# Patient Record
Sex: FEMALE | Race: WHITE | Employment: FULL TIME | URBAN - NONMETROPOLITAN AREA
[De-identification: names, ages, dates, MRNs, and addresses within clinical notes are randomized per-mention and may not be internally consistent; named-entity substitution may affect disease eponyms.]

---

## 2017-03-20 RX ORDER — ALPRAZOLAM 0.5 MG/1
0.5 TABLET ORAL 2 TIMES DAILY PRN
Qty: 20 TABLET | Refills: 0 | OUTPATIENT
Start: 2017-03-20 | End: 2017-04-19

## 2017-06-07 ENCOUNTER — TELEPHONE (OUTPATIENT)
Dept: OBGYN | Age: 47
End: 2017-06-07

## 2017-06-08 ENCOUNTER — HOSPITAL ENCOUNTER (OUTPATIENT)
Dept: CT IMAGING | Age: 47
Discharge: HOME | End: 2017-06-08
Payer: OTHER GOVERNMENT

## 2017-06-08 ENCOUNTER — HOSPITAL ENCOUNTER (OUTPATIENT)
Dept: VASCULAR LAB | Age: 47
Discharge: HOME | End: 2017-06-08
Payer: OTHER GOVERNMENT

## 2017-06-08 DIAGNOSIS — G44.001 INTRACTABLE CLUSTER HEADACHE SYNDROME, UNSPECIFIED CHRONICITY PATTERN: ICD-10-CM

## 2017-06-08 DIAGNOSIS — R20.2 PARESTHESIA: ICD-10-CM

## 2017-06-08 DIAGNOSIS — R20.2 PARESTHESIA OF SKIN: Primary | ICD-10-CM

## 2017-06-08 PROCEDURE — 93880 EXTRACRANIAL BILAT STUDY: CPT

## 2017-06-08 PROCEDURE — 70450 CT HEAD/BRAIN W/O DYE: CPT

## 2018-05-02 ENCOUNTER — HOSPITAL ENCOUNTER (OUTPATIENT)
Dept: NEUROLOGY | Age: 48
Discharge: HOME | End: 2018-05-02
Payer: OTHER GOVERNMENT

## 2018-05-02 PROCEDURE — 95886 MUSC TEST DONE W/N TEST COMP: CPT

## 2018-05-02 PROCEDURE — 95911 NRV CNDJ TEST 9-10 STUDIES: CPT

## 2018-05-02 PROCEDURE — 95886 MUSC TEST DONE W/N TEST COMP: CPT | Performed by: PSYCHIATRY & NEUROLOGY

## 2018-05-02 PROCEDURE — 95911 NRV CNDJ TEST 9-10 STUDIES: CPT | Performed by: PSYCHIATRY & NEUROLOGY

## 2019-05-30 ENCOUNTER — EMPLOYEE WELLNESS (OUTPATIENT)
Dept: OTHER | Age: 49
End: 2019-05-30

## 2019-05-30 LAB
CHOLESTEROL, TOTAL: 186 MG/DL (ref 160–199)
GLUCOSE BLD-MCNC: 70 MG/DL (ref 74–109)
HDLC SERPL-MCNC: 50 MG/DL (ref 65–121)
LDL CHOLESTEROL CALCULATED: 122 MG/DL
TRIGL SERPL-MCNC: 70 MG/DL (ref 0–149)

## 2019-06-03 VITALS — BODY MASS INDEX: 21.61 KG/M2 | WEIGHT: 122 LBS

## 2019-11-07 ENCOUNTER — HOSPITAL ENCOUNTER (OUTPATIENT)
Dept: WOMENS IMAGING | Age: 49
Discharge: HOME | End: 2019-11-07
Payer: OTHER GOVERNMENT

## 2019-11-07 DIAGNOSIS — Z12.39 ENCOUNTER FOR SCREENING FOR MALIGNANT NEOPLASM OF BREAST: ICD-10-CM

## 2019-11-07 PROCEDURE — 77063 BREAST TOMOSYNTHESIS BI: CPT

## 2019-12-13 LAB
CHOLESTEROL, TOTAL: 200 MG/DL (ref 160–199)
HDLC SERPL-MCNC: 65 MG/DL (ref 65–121)
LDL CHOLESTEROL CALCULATED: 123 MG/DL
TRIGL SERPL-MCNC: 58 MG/DL (ref 0–149)
VITAMIN D 25-HYDROXY: 29.8 NG/ML

## 2019-12-18 LAB
C-REACTIVE PROTEIN: 0.41 MG/DL (ref 0–0.5)
RHEUMATOID FACTOR: <10 IU/ML
SEDIMENTATION RATE, ERYTHROCYTE: 7 MM/HR (ref 0–25)

## 2019-12-19 LAB — ANA IGG, ELISA: DETECTED

## 2020-08-31 ENCOUNTER — OFFICE VISIT (OUTPATIENT)
Age: 50
End: 2020-08-31

## 2020-08-31 VITALS — OXYGEN SATURATION: 98 % | HEART RATE: 69 BPM | TEMPERATURE: 97.7 F

## 2020-09-03 LAB — SARS-COV-2, NAA: NOT DETECTED

## 2020-09-04 ENCOUNTER — ANESTHESIA (OUTPATIENT)
Dept: OPERATING ROOM | Age: 50
End: 2020-09-04

## 2020-09-04 ENCOUNTER — HOSPITAL ENCOUNTER (OUTPATIENT)
Age: 50
Setting detail: OUTPATIENT SURGERY
Discharge: HOME | End: 2020-09-04
Attending: INTERNAL MEDICINE | Admitting: INTERNAL MEDICINE
Payer: OTHER GOVERNMENT

## 2020-09-04 ENCOUNTER — APPOINTMENT (OUTPATIENT)
Dept: OPERATING ROOM | Age: 50
End: 2020-09-04

## 2020-09-04 ENCOUNTER — HOSPITAL ENCOUNTER (OUTPATIENT)
Age: 50
Setting detail: SPECIMEN
Discharge: HOME | End: 2020-09-04
Payer: OTHER GOVERNMENT

## 2020-09-04 ENCOUNTER — ANESTHESIA EVENT (OUTPATIENT)
Dept: OPERATING ROOM | Age: 50
End: 2020-09-04

## 2020-09-04 VITALS
BODY MASS INDEX: 24.27 KG/M2 | WEIGHT: 137 LBS | OXYGEN SATURATION: 96 % | RESPIRATION RATE: 16 BRPM | SYSTOLIC BLOOD PRESSURE: 98 MMHG | HEIGHT: 63 IN | DIASTOLIC BLOOD PRESSURE: 46 MMHG | HEART RATE: 74 BPM

## 2020-09-04 VITALS — SYSTOLIC BLOOD PRESSURE: 101 MMHG | DIASTOLIC BLOOD PRESSURE: 55 MMHG | OXYGEN SATURATION: 95 %

## 2020-09-04 PROCEDURE — 43248 EGD GUIDE WIRE INSERTION: CPT

## 2020-09-04 PROCEDURE — 43239 EGD BIOPSY SINGLE/MULTIPLE: CPT | Performed by: INTERNAL MEDICINE

## 2020-09-04 PROCEDURE — 43248 EGD GUIDE WIRE INSERTION: CPT | Performed by: INTERNAL MEDICINE

## 2020-09-04 PROCEDURE — 43239 EGD BIOPSY SINGLE/MULTIPLE: CPT

## 2020-09-04 PROCEDURE — 88305 TISSUE EXAM BY PATHOLOGIST: CPT

## 2020-09-04 PROCEDURE — 88342 IMHCHEM/IMCYTCHM 1ST ANTB: CPT

## 2020-09-04 RX ORDER — LIDOCAINE HYDROCHLORIDE 10 MG/ML
INJECTION, SOLUTION EPIDURAL; INFILTRATION; INTRACAUDAL; PERINEURAL PRN
Status: DISCONTINUED | OUTPATIENT
Start: 2020-09-04 | End: 2020-09-04 | Stop reason: SDUPTHER

## 2020-09-04 RX ORDER — LEFLUNOMIDE 20 MG/1
20 TABLET ORAL DAILY
COMMUNITY
End: 2021-06-29 | Stop reason: ALTCHOICE

## 2020-09-04 RX ORDER — PROPOFOL 10 MG/ML
INJECTION, EMULSION INTRAVENOUS PRN
Status: DISCONTINUED | OUTPATIENT
Start: 2020-09-04 | End: 2020-09-04 | Stop reason: SDUPTHER

## 2020-09-04 RX ORDER — HYDROXYCHLOROQUINE SULFATE 200 MG/1
200 TABLET, FILM COATED ORAL DAILY
COMMUNITY
End: 2021-06-29 | Stop reason: ALTCHOICE

## 2020-09-04 RX ORDER — PANTOPRAZOLE SODIUM 40 MG/1
40 TABLET, DELAYED RELEASE ORAL DAILY
Qty: 30 TABLET | Refills: 11 | Status: SHIPPED | OUTPATIENT
Start: 2020-09-04 | End: 2021-03-19

## 2020-09-04 RX ORDER — SODIUM CHLORIDE 9 MG/ML
INJECTION, SOLUTION INTRAVENOUS CONTINUOUS
Status: DISCONTINUED | OUTPATIENT
Start: 2020-09-04 | End: 2020-09-04 | Stop reason: HOSPADM

## 2020-09-04 RX ORDER — EPHEDRINE SULFATE 50 MG/ML
INJECTION, SOLUTION INTRAVENOUS PRN
Status: DISCONTINUED | OUTPATIENT
Start: 2020-09-04 | End: 2020-09-04 | Stop reason: SDUPTHER

## 2020-09-04 RX ORDER — MIDAZOLAM HYDROCHLORIDE 1 MG/ML
2 INJECTION INTRAMUSCULAR; INTRAVENOUS ONCE
Status: COMPLETED | OUTPATIENT
Start: 2020-09-04 | End: 2020-09-04

## 2020-09-04 RX ADMIN — EPHEDRINE SULFATE 10 MG: 50 INJECTION, SOLUTION INTRAVENOUS at 09:12

## 2020-09-04 RX ADMIN — PROPOFOL 310 MG: 10 INJECTION, EMULSION INTRAVENOUS at 09:03

## 2020-09-04 RX ADMIN — LIDOCAINE HYDROCHLORIDE 5 ML: 10 INJECTION, SOLUTION EPIDURAL; INFILTRATION; INTRACAUDAL; PERINEURAL at 09:03

## 2020-09-04 RX ADMIN — SODIUM CHLORIDE: 9 INJECTION, SOLUTION INTRAVENOUS at 08:14

## 2020-09-04 RX ADMIN — MIDAZOLAM HYDROCHLORIDE 2 MG: 1 INJECTION INTRAMUSCULAR; INTRAVENOUS at 08:14

## 2020-09-04 ASSESSMENT — LIFESTYLE VARIABLES: SMOKING_STATUS: 0

## 2020-09-04 NOTE — H&P
Patient Name: Vera Thomas  : 1970  MRN: [id number]  DATE: 20    Allergies: Allergies   Allergen Reactions    Codeine Itching and Rash    Sulfa Antibiotics         ENDOSCOPY  History and Physical    Procedure:    [] Diagnostic Colonoscopy       [] Screening Colonoscopy  [x] EGD      [] ERCP      [] EUS       [] Other    [x] Previous office notes/History and Physical reviewed from the patients chart. Please see EMR for further details of HPI. I have examined the patient's status immediately prior to the procedure and:      Indications/HPI:    []Abdominal Pain   []Cancer- GI/Lung     []Fhx of colon CA/polyps  []History of Polyps  []Barretts            []Melena  []Abnormal Imaging              [x]Dysphagia              []Persistent Pneumonia   []Anemia                            []Food Impaction        []History of Polyps  [] GI Bleed             []Pulmonary nodule/Mass   []Change in bowel habits []Heartburn/Reflux  []Rectal Bleed (BRBPR)  []Chest Pain - Non Cardiac []Heme (+) Stool []Ulcers  []Constipation  []Hemoptysis  []Varices  []Diarrhea  []Hypoxemia    []Nausea/Vomiting   []Screening   []Crohns/Colitis  []Other:     Anesthesia:   [x] MAC [] Moderate Sedation   [] General   [] None     ROS: 12 pt Review of Symptoms was negative unless mentioned above    Medications:   Prior to Admission medications    Medication Sig Start Date End Date Taking?  Authorizing Provider   leflunomide (ARAVA) 20 MG tablet Take 20 mg by mouth daily   Yes Historical Provider, MD   hydroxychloroquine (PLAQUENIL) 200 MG tablet Take 200 mg by mouth daily   Yes Historical Provider, MD   prednisoLONE 5 MG TABS Take by mouth   Yes Historical Provider, MD   naproxen (NAPROSYN) 500 MG tablet  6/23/15  Yes Historical Provider, MD   estradiol (ESTRACE) 2 MG tablet TAKE 1 TABLET BY MOUTH DAILY 16   Mark Reyes MD   fluticasone [facility]) 50 MCG/ACT nasal spray 2 sprays by Nasal route daily 16   Mark Reyes MD azelastine (ASTELIN) 0.1 % nasal spray 2 sprays by Nasal route 2 times daily 8/29/16   Mat Hannah MD   [facility]. 2.5 % rectal cream Apply BID as needed for hemorrhoids 8/29/16   Mat Hannah MD   omeprazole (PRILOSEC) 40 MG capsule  3/30/15   Historical Provider, MD   Vitamin D (CHOLECALCIFEROL) 1000 UNITS CAPS capsule Take 1,000 Units by mouth daily    Historical Provider, MD       Past Medical History:  Past Medical History:   Diagnosis Date    Asthma     Endometriosis     Osteopenia 2013       Past Surgical History:  Past Surgical History:   Procedure Laterality Date    COLONOSCOPY  10/17/2016    Dr Sobeida Roche, 5 yr recall    HYSTERECTOMY, VAGINAL      Total    LAPAROSCOPY         Social History:  Social History     Tobacco Use    Smoking status: Former Smoker    Smokeless tobacco: Never Used   Substance Use Topics    Alcohol use: No     Alcohol/week: 0.0 standard drinks    Drug use: No       Vital Signs:   Vitals:    09/04/20 0744   BP: 117/76   Pulse: 64   Resp: 16   SpO2: 99%        Physical Exam:  Cardiac:  [x]WNL  []Comments:  Pulmonary:  [x]WNL   []Comments:  Neuro/Mental Status:  [x]WNL  []Comments:  Abdominal:  [x]WNL    []Comments:  Other:   []WNL  []Comments:    Informed Consent:  The risks and benefits of the procedure have been discussed with either the patient or if they cannot consent, their representative. Assessment:  Patient examined and appropriate for planned sedation and procedure. Plan:  Proceed with planned sedation and procedure as above.          Oscar Cline MD

## 2020-09-04 NOTE — OP NOTE
Esophagitis vs Silverman's - biopsied      RECOMMENDATIONS:    1. Await path results, the patient will be contacted in 7-10 days with biopsy results. 2.  PPI daily  3. NSAID avoidance  4. Repeat Dilation as needed. The results were discussed with the patient and family. A copy of the images obtained were given to the patient.      Oscar Cline MD  9/4/2020  9:31 AM

## 2020-09-15 ENCOUNTER — TELEPHONE (OUTPATIENT)
Dept: URGENT CARE | Facility: CLINIC | Age: 50
End: 2020-09-15

## 2020-09-15 PROCEDURE — 87635 SARS-COV-2 COVID-19 AMP PRB: CPT | Performed by: NURSE PRACTITIONER

## 2021-01-27 ENCOUNTER — HOSPITAL ENCOUNTER (OUTPATIENT)
Dept: CT IMAGING | Age: 51
Discharge: HOME | End: 2021-01-27
Payer: OTHER GOVERNMENT

## 2021-01-27 ENCOUNTER — OFFICE VISIT (OUTPATIENT)
Dept: SURGERY | Age: 51
End: 2021-01-27
Payer: OTHER GOVERNMENT

## 2021-01-27 VITALS
WEIGHT: 135 LBS | BODY MASS INDEX: 23.92 KG/M2 | DIASTOLIC BLOOD PRESSURE: 74 MMHG | SYSTOLIC BLOOD PRESSURE: 118 MMHG | HEIGHT: 63 IN | TEMPERATURE: 97.2 F

## 2021-01-27 DIAGNOSIS — R10.84 GENERALIZED ABDOMINAL PAIN: ICD-10-CM

## 2021-01-27 DIAGNOSIS — R10.13 EPIGASTRIC PAIN: ICD-10-CM

## 2021-01-27 DIAGNOSIS — R10.11 RIGHT UPPER QUADRANT ABDOMINAL PAIN: Primary | ICD-10-CM

## 2021-01-27 DIAGNOSIS — R10.13 EPIGASTRIC PAIN: Primary | ICD-10-CM

## 2021-01-27 DIAGNOSIS — Z76.89 ENCOUNTER TO ESTABLISH CARE: ICD-10-CM

## 2021-01-27 LAB
ALBUMIN SERPL-MCNC: 4.6 G/DL (ref 3.5–5.2)
ALP BLD-CCNC: 81 U/L (ref 35–104)
ALT SERPL-CCNC: 40 U/L (ref 5–33)
ANION GAP SERPL CALCULATED.3IONS-SCNC: 10 MMOL/L (ref 7–19)
AST SERPL-CCNC: 29 U/L (ref 5–32)
BASOPHILS ABSOLUTE: 0 K/UL (ref 0–0.2)
BASOPHILS RELATIVE PERCENT: 0.7 % (ref 0–1)
BILIRUB SERPL-MCNC: <0.2 MG/DL (ref 0.2–1.2)
BUN BLDV-MCNC: 15 MG/DL (ref 6–20)
CALCIUM SERPL-MCNC: 9.1 MG/DL (ref 8.6–10)
CHLORIDE BLD-SCNC: 103 MMOL/L (ref 98–111)
CO2: 29 MMOL/L (ref 22–29)
CREAT SERPL-MCNC: 0.7 MG/DL (ref 0.5–0.9)
EOSINOPHILS ABSOLUTE: 0.1 K/UL (ref 0–0.6)
EOSINOPHILS RELATIVE PERCENT: 1.8 % (ref 0–5)
GFR AFRICAN AMERICAN: >59
GFR NON-AFRICAN AMERICAN: >60
GLUCOSE BLD-MCNC: 78 MG/DL (ref 74–109)
HCT VFR BLD CALC: 40.8 % (ref 37–47)
HEMOGLOBIN: 13.5 G/DL (ref 12–16)
IMMATURE GRANULOCYTES #: 0 K/UL
LYMPHOCYTES ABSOLUTE: 1.8 K/UL (ref 1.1–4.5)
LYMPHOCYTES RELATIVE PERCENT: 29.5 % (ref 20–40)
MCH RBC QN AUTO: 31.1 PG (ref 27–31)
MCHC RBC AUTO-ENTMCNC: 33.1 G/DL (ref 33–37)
MCV RBC AUTO: 94 FL (ref 81–99)
MONOCYTES ABSOLUTE: 0.6 K/UL (ref 0–0.9)
MONOCYTES RELATIVE PERCENT: 9.8 % (ref 0–10)
NEUTROPHILS ABSOLUTE: 3.6 K/UL (ref 1.5–7.5)
NEUTROPHILS RELATIVE PERCENT: 58 % (ref 50–65)
PDW BLD-RTO: 12.7 % (ref 11.5–14.5)
PLATELET # BLD: 206 K/UL (ref 130–400)
PMV BLD AUTO: 12.4 FL (ref 9.4–12.3)
POTASSIUM SERPL-SCNC: 3.8 MMOL/L (ref 3.5–5)
RBC # BLD: 4.34 M/UL (ref 4.2–5.4)
SODIUM BLD-SCNC: 142 MMOL/L (ref 136–145)
TOTAL PROTEIN: 7.3 G/DL (ref 6.6–8.7)
WBC # BLD: 6.1 K/UL (ref 4.8–10.8)

## 2021-01-27 PROCEDURE — 99204 OFFICE O/P NEW MOD 45 MIN: CPT | Performed by: SURGERY

## 2021-01-27 PROCEDURE — 74177 CT ABD & PELVIS W/CONTRAST: CPT

## 2021-01-27 PROCEDURE — 6360000004 HC RX CONTRAST MEDICATION: Performed by: SURGERY

## 2021-01-27 RX ADMIN — IOPAMIDOL 90 ML: 755 INJECTION, SOLUTION INTRAVENOUS at 13:49

## 2021-01-27 ASSESSMENT — ENCOUNTER SYMPTOMS
SORE THROAT: 0
COUGH: 0
EYE REDNESS: 0
ABDOMINAL DISTENTION: 0
EYE PAIN: 0
NAUSEA: 0
SHORTNESS OF BREATH: 0
DIARRHEA: 0
COLOR CHANGE: 0
CHEST TIGHTNESS: 0
VOMITING: 0
BACK PAIN: 0
CONSTIPATION: 0
ABDOMINAL PAIN: 0

## 2021-01-27 NOTE — PROGRESS NOTES
SUBJECTIVE:  Ms. Melia Red is a 45 y.o. female who presents today with abdominal pain. She sates it started in early December. It has not improved since that time. The pain is above the belly-button and in the epigastric region without radiation. Sometimes if she can get in a comfortable position it will improve. She had an EGD in September that showed gastritis and esophagitis and this feels different from that. She tried a colon cleanse and has not seen improvement. Her bowel movements fluctuate between loose and constipation due to her sjogren's syndrome. She denies nausea and vomiting. At times she takes aleve as needed but hasn't taken it recently, only when her RA really acts up. Past Medical History:   Diagnosis Date    Allergic rhinitis     Asthma     Endometriosis     GERD (gastroesophageal reflux disease)     Osteopenia 2013    RA (rheumatoid arthritis) ([address]     Sjogren's disease [address] [address]      Past Surgical History:   Procedure Laterality Date    COLONOSCOPY  10/17/2016    Dr Werner Al, 5 yr recall    HYSTERECTOMY, VAGINAL      total-lap ass    TUBAL LIGATION      UPPER GASTROINTESTINAL ENDOSCOPY N/A 9/4/2020    Dr Queenie Cornelius-[id number], gastritis     Current Outpatient Medications   Medication Sig Dispense Refill    leflunomide (ARAVA) 20 MG tablet Take 20 mg by mouth daily      hydroxychloroquine (PLAQUENIL) 200 MG tablet Take 200 mg by mouth daily      prednisoLONE 5 MG TABS Take by mouth      pantoprazole (PROTONIX) 40 MG tablet Take 1 tablet by mouth daily Take daily first thing in the morning on an empty stomach.  30 tablet 11    estradiol (ESTRACE) 2 MG tablet TAKE 1 TABLET BY MOUTH DAILY 30 tablet 11    fluticasone (FLONASE) 50 MCG/ACT nasal spray 2 sprays by Nasal route daily (Patient not taking: Reported on 1/27/2021) 1 Bottle 11    azelastine (ASTELIN) 0.1 % nasal spray 2 sprays by Nasal route 2 times daily (Patient not taking: Reported on 1/27/2021) 1 Bottle 11    PROCTOSOL HC 2.5 % rectal cream Apply BID as needed for hemorrhoids (Patient not taking: Reported on 1/27/2021) 1 Tube 6    naproxen (NAPROSYN) 500 MG tablet       Vitamin D (CHOLECALCIFEROL) 1000 UNITS CAPS capsule Take 1,000 Units by mouth daily       No current facility-administered medications for this visit. Allergies: Codeine, Morphine, and Sulfa antibiotics    Family History   Problem Relation Age of Onset    Uterine Cancer Mother 22    Stomach Cancer Mother     Breast Cancer Maternal Grandmother 28    Ovarian Cancer Maternal Aunt 28    Cancer Maternal Uncle 27        melanoma    Cancer Maternal Uncle         liver       Social History     Tobacco Use    Smoking status: Never Smoker    Smokeless tobacco: Never Used   Substance Use Topics    Alcohol use: No     Alcohol/week: 0.0 standard drinks       Review of Systems   Constitutional: Positive for fatigue. Negative for fever and unexpected weight change. HENT: Positive for tinnitus. Negative for hearing loss, nosebleeds and sore throat. Eyes: Negative for pain, redness and visual disturbance. Respiratory: Negative for cough, chest tightness and shortness of breath. Cardiovascular: Negative for chest pain, palpitations and leg swelling. Gastrointestinal: Negative for abdominal distention, abdominal pain, constipation, diarrhea, nausea and vomiting. Endocrine: Positive for cold intolerance and polydipsia. Negative for heat intolerance. Genitourinary: Negative for difficulty urinating, frequency and urgency. Musculoskeletal: Positive for arthralgias. Negative for back pain, joint swelling and neck pain. Skin: Negative for color change, rash and wound. Allergic/Immunologic: Positive for immunocompromised state. Negative for environmental allergies and food allergies. Neurological: Negative for seizures, light-headedness and headaches. Hematological: Negative for adenopathy. Does not bruise/bleed easily. Psychiatric/Behavioral: Negative for confusion, sleep disturbance and suicidal ideas. OBJECTIVE:  /74 (Site: Right Upper Arm, Position: Sitting, Cuff Size: Medium Adult)   Temp 97.2 °F (36.2 °C) (Temporal)   Ht 5' 3\" (1.6 m)   Wt 135 lb (61.2 kg)   BMI 23.91 kg/m²   Physical Exam  Vitals signs reviewed. Constitutional:       Appearance: She is well-developed. HENT:      Head: Normocephalic and atraumatic. Eyes:      Pupils: Pupils are equal, round, and reactive to light. Neck:      Musculoskeletal: Normal range of motion. Cardiovascular:      Rate and Rhythm: Normal rate and regular rhythm. Heart sounds: Normal heart sounds. Pulmonary:      Effort: Pulmonary effort is normal.      Breath sounds: Normal breath sounds. No wheezing or rales. Abdominal:      General: Bowel sounds are normal. There is no distension. Palpations: Abdomen is soft. Tenderness: There is abdominal tenderness in the right upper quadrant, right lower quadrant, epigastric area and periumbilical area. There is no guarding or rebound. Negative signs include Stoddard's sign. Hernia: No hernia is present. Comments: Areas of maximum tenderness as marked. Musculoskeletal: Normal range of motion. Lymphadenopathy:      Cervical: No cervical adenopathy. Skin:     General: Skin is warm and dry. Neurological:      Mental Status: She is alert and oriented to person, place, and time. Deep Tendon Reflexes: Reflexes are normal and symmetric. Psychiatric:         Behavior: Behavior normal.         Thought Content: Thought content normal.         Judgment: Judgment normal.         ASSESSMENT:   Diagnosis Orders   1. Epigastric pain  CBC Auto Differential    Comprehensive Metabolic Panel    CT ABDOMEN PELVIS W IV CONTRAST Additional Contrast? Oral   2. Generalized abdominal pain  CT ABDOMEN PELVIS W IV CONTRAST Additional Contrast? Oral   3.  Encounter to establish care  External Referral To Family Practice       PLAN:  Orders Placed This Encounter   Procedures    CT ABDOMEN PELVIS W IV CONTRAST Additional Contrast? Oral     Standing Status:   Future     Standing Expiration Date:   1/27/2022     Order Specific Question:   Additional Contrast?     Answer:   Oral     Order Specific Question:   Reason for exam:     Answer:   epigastric and right sided pain for 1 month    CBC Auto Differential     Standing Status:   Future     Number of Occurrences:   1     Standing Expiration Date:   1/27/2022    Comprehensive Metabolic Panel     Standing Status:   Future     Number of Occurrences:   1     Standing Expiration Date:   1/27/2022    External Referral To Family Practice     Referral Priority:   Routine     Referral Type:   Eval and Treat     Referral Reason:   Specialty Services Required     Requested Specialty:   Family Medicine     Number of Visits Requested:   1     Will follow up on testing and contact patient with results. If CT is unremarkable will proceed with RUQ sonogram/HIDA. Return for Results Discussion.     Rod Arndt DO 1/03/4092 12:08 PM

## 2021-01-27 NOTE — PROGRESS NOTES
Reviewed CT scan and labs with patient. No significant findings. Will order RUQ US and HIDA if needed. Plan to drink miralax to assist in BMs. Patient notes udnerstanding. If pain resolves with miralax will cancel imaging.      Impression   1. No acute inflammatory process seen in the abdomen or pelvis. Gallbladder is contracted and therefore limited in its assessment. No   peripancreatic inflammation. Decompressed stomach. Moderate stool in   the colon. Normal appendix. Prior hysterectomy.    Signed by Dr Lopez Courser on 1/27/2021 2:15 PM         WBC 6.1  4.8 - 10.8 K/uL Final 01/27/2021 12:08 PM [address] [facility]   RBC 4.34  4.20 - 5.40 M/uL Final 01/27/2021 12:08 PM [address] [facility]   Hemoglobin 13.5  12.0 - 16.0 g/dL Final 01/27/2021 12:08 PM [address] [facility]   Hematocrit 40.8  37.0 - 47.0 % Final 01/27/2021 12:08 PM [facility]   MCV 94.0  81.0 - 99.0 fL Final 01/27/2021 12:08 PM [facility]   [facility] 31.1High   27.0 - 31.0 pg Final 01/27/2021 12:08 PM [address] [facility]   MCHC 33.1  33.0 - 37.0 g/dL Final 01/27/2021 12:08 PM [address] [facility]   RDW 12.7  11.5 - 14.5 % Final 01/27/2021 12:08 PM [address] [facility]   Platelets 118  043 - 400 K/uL Final 01/27/2021 12:08 PM [facility]   MPV 12.4High   9.4 - 12.3 fL Final 01/27/2021 12:08 PM [facility]   Neutrophils % 58.0  50.0 - 65.0 % Final 01/27/2021 12:08 PM [facility]   Lymphocytes % 29.5  20.0 - 40.0 % Final 01/27/2021 12:08 PM [address] [facility]   Monocytes % 9.8  0.0 - 10.0 % Final 01/27/2021 12:08 PM [address] [facility]   Eosinophils % 1.8  0.0 - 5.0 % Final 01/27/2021 12:08 PM [facility]   Basophils % 0.7  0.0 - 1.0 % Final 01/27/2021 12:08 PM [address] [facility]   Neutrophils Absolute 3.6  1.5 - 7.5 K/uL Final 01/27/2021 12:08 PM [address] [facility]   Immature Granulocytes # 0.0  K/uL Final 01/27/2021 12:08 PM [address] [facility]   Lymphocytes Absolute 1.8  1.1 - 4.5 K/uL Final 01/27/2021 12:08 PM [address] [facility]   Monocytes Absolute 0.60  0.00 - 0.90 K/uL Final 01/27/2021 12:08 PM [address] [facility]   Eosinophils Absolute 0.10  0.00 - 0.60 K/uL Final 01/27/2021 12:08 PM [facility]   Basophils Absolute 0.00  0.00 - 0.20 K/uL Final 01/27/2021 12:08 PM [address] [facility]   Testing Performed By    Luis Sanchez Name Director Address Valid Date Range   [address] - [address] LAB Isidra Asif MD [address] [id number] 08/30/17 0733-Present     Sodium 142  136 - 145 mmol/L Final 01/27/2021 12:08 PM [address] [place] [address] Lab   Potassium 3.8  3.5 - 5.0 mmol/L Final 01/27/2021 12:08 PM [address] [place] [address] Lab   Chloride 103  98 - 111 mmol/L Final 01/27/2021 12:08 PM [facility] Lab   CO2 29  22 - 29 mmol/L Final 01/27/2021 12:08 PM [facility]   Anion Gap 10  7 - 19 mmol/L Final 01/27/2021 12:08 PM [facility] Lab   Glucose 78  74 - 109 mg/dL Final 01/27/2021 12:08 PM [facility]   BUN 15  6 - 20 mg/dL Final 01/27/2021 12:08 PM [facility]   CREATININE 0.7  0.5 - 0.9 mg/dL Final 01/27/2021 12:08 PM [address] [facility] Lab   GFR Non-African American >60  >60 Final 01/27/2021 12:08 PM [address] [facility]   This calculation may be inaccurate for patients under the age of 25 years. For ages 25 and older, a GFR >60 mL/min/1.73m2 (not corrected for weight) is   valid for stable renal function. GFR  >59  >59 Final 01/27/2021 12:08 PM [address] [facility] Lab   Chronic Kidney Disease: less than 60 ml/min/1.73 sq. m.         Kidney Failure: less than 15 ml/min/1.73 sq.m. Results valid for patients 18 years and older.     Calcium 9.1  8.6 - 10.0 mg/dL Final 01/27/2021 12:08 PM [address] [facility] Lab   Total Protein 7.3 6.6 - 8.7 g/dL Final 01/27/2021 12:08  [address] Lab   Albumin 4.6  3.5 - 5.2 g/dL Final 01/27/2021 12:08  [address] Lab   Total Bilirubin <0.2  0.2 - 1.2 mg/dL Final 01/27/2021 12:08  [address] Lab   Alkaline Phosphatase 81  35 - 104 U/L Final 01/27/2021 12:08  [address] Lab   ALT 40High   5 - 33 U/L Final 01/27/2021 12:08  [address] Lab   AST 29  5 - 32 U/L Final 01/27/2021 12:08 PM [facility] Lab   Testing Performed By    Luis Sanchez Name Director Address Valid Date Range   [address] - [address] LAB [place]  [place],[address]   [address] [address] 08/30/17 [address], DO  3/79/42  3:34 PM

## 2021-02-03 ENCOUNTER — OFFICE VISIT (OUTPATIENT)
Age: 51
End: 2021-02-03

## 2021-02-03 VITALS — OXYGEN SATURATION: 95 % | HEART RATE: 70 BPM

## 2021-02-03 DIAGNOSIS — Z11.59 SCREENING FOR VIRAL DISEASE: Primary | ICD-10-CM

## 2021-02-03 LAB — SARS-COV-2, PCR: NOT DETECTED

## 2021-02-03 PROCEDURE — 99999 PR OFFICE/OUTPT VISIT,PROCEDURE ONLY: CPT | Performed by: NURSE PRACTITIONER

## 2021-02-05 ENCOUNTER — APPOINTMENT (OUTPATIENT)
Dept: OPERATING ROOM | Age: 51
End: 2021-02-05

## 2021-02-05 ENCOUNTER — TELEPHONE (OUTPATIENT)
Dept: SURGERY | Age: 51
End: 2021-02-05

## 2021-02-05 ENCOUNTER — ANESTHESIA (OUTPATIENT)
Dept: OPERATING ROOM | Age: 51
End: 2021-02-05

## 2021-02-05 ENCOUNTER — HOSPITAL ENCOUNTER (OUTPATIENT)
Age: 51
Setting detail: SPECIMEN
Discharge: HOME | End: 2021-02-05
Payer: OTHER GOVERNMENT

## 2021-02-05 ENCOUNTER — ANESTHESIA EVENT (OUTPATIENT)
Dept: OPERATING ROOM | Age: 51
End: 2021-02-05

## 2021-02-05 ENCOUNTER — HOSPITAL ENCOUNTER (OUTPATIENT)
Age: 51
Setting detail: OUTPATIENT SURGERY
Discharge: HOME | End: 2021-02-05
Attending: INTERNAL MEDICINE | Admitting: INTERNAL MEDICINE
Payer: OTHER GOVERNMENT

## 2021-02-05 VITALS
HEART RATE: 63 BPM | OXYGEN SATURATION: 97 % | HEIGHT: 63 IN | TEMPERATURE: 97.2 F | BODY MASS INDEX: 23.04 KG/M2 | RESPIRATION RATE: 18 BRPM | SYSTOLIC BLOOD PRESSURE: 108 MMHG | DIASTOLIC BLOOD PRESSURE: 63 MMHG | WEIGHT: 130 LBS

## 2021-02-05 VITALS — DIASTOLIC BLOOD PRESSURE: 28 MMHG | SYSTOLIC BLOOD PRESSURE: 116 MMHG | OXYGEN SATURATION: 98 %

## 2021-02-05 PROCEDURE — 43239 EGD BIOPSY SINGLE/MULTIPLE: CPT

## 2021-02-05 PROCEDURE — 88342 IMHCHEM/IMCYTCHM 1ST ANTB: CPT

## 2021-02-05 PROCEDURE — 43248 EGD GUIDE WIRE INSERTION: CPT

## 2021-02-05 PROCEDURE — 43248 EGD GUIDE WIRE INSERTION: CPT | Performed by: INTERNAL MEDICINE

## 2021-02-05 PROCEDURE — G8907 PT DOC NO EVENTS ON DISCHARG: HCPCS

## 2021-02-05 PROCEDURE — 88305 TISSUE EXAM BY PATHOLOGIST: CPT

## 2021-02-05 PROCEDURE — 43239 EGD BIOPSY SINGLE/MULTIPLE: CPT | Performed by: INTERNAL MEDICINE

## 2021-02-05 PROCEDURE — G8918 PT W/O PREOP ORDER IV AB PRO: HCPCS

## 2021-02-05 PROCEDURE — 45378 DIAGNOSTIC COLONOSCOPY: CPT

## 2021-02-05 PROCEDURE — 45378 DIAGNOSTIC COLONOSCOPY: CPT | Performed by: INTERNAL MEDICINE

## 2021-02-05 RX ORDER — PROPOFOL 10 MG/ML
INJECTION, EMULSION INTRAVENOUS PRN
Status: DISCONTINUED | OUTPATIENT
Start: 2021-02-05 | End: 2021-02-05 | Stop reason: SDUPTHER

## 2021-02-05 RX ORDER — SODIUM CHLORIDE 9 MG/ML
INJECTION, SOLUTION INTRAVENOUS CONTINUOUS
Status: DISCONTINUED | OUTPATIENT
Start: 2021-02-05 | End: 2021-02-05 | Stop reason: HOSPADM

## 2021-02-05 RX ORDER — LIDOCAINE HYDROCHLORIDE 10 MG/ML
INJECTION, SOLUTION EPIDURAL; INFILTRATION; INTRACAUDAL; PERINEURAL PRN
Status: DISCONTINUED | OUTPATIENT
Start: 2021-02-05 | End: 2021-02-05 | Stop reason: SDUPTHER

## 2021-02-05 RX ADMIN — LIDOCAINE HYDROCHLORIDE 50 MG: 10 INJECTION, SOLUTION EPIDURAL; INFILTRATION; INTRACAUDAL; PERINEURAL at 08:15

## 2021-02-05 RX ADMIN — PROPOFOL 350 MG: 10 INJECTION, EMULSION INTRAVENOUS at 08:15

## 2021-02-05 RX ADMIN — SODIUM CHLORIDE: 9 INJECTION, SOLUTION INTRAVENOUS at 08:07

## 2021-02-05 NOTE — TELEPHONE ENCOUNTER
Called and left vm message with patient to see if she had been contacted by Dr. Jose Ocampo office about an appt there.

## 2021-02-05 NOTE — ANESTHESIA PRE PROCEDURE
Department of Anesthesiology  Preprocedure Note       Name:  Valorie Dominguez   Age:  45 y.o.  :  1970                                          MRN:  [id number]         Date:  2021      Surgeon: Darlyn Nava):  Fauzia Baker MD    Procedure: Procedure(s):  EGD ESOPHAGOGASTRODUODENOSCOPY  COLONOSCOPY DIAGNOSTIC    Medications prior to admission:   Prior to Admission medications    Medication Sig Start Date End Date Taking? Authorizing Provider   leflunomide (ARAVA) 20 MG tablet Take 20 mg by mouth daily    Historical Provider, MD   hydroxychloroquine (PLAQUENIL) 200 MG tablet Take 200 mg by mouth daily    Historical Provider, MD   prednisoLONE 5 MG TABS Take by mouth    Historical Provider, MD   pantoprazole (PROTONIX) 40 MG tablet Take 1 tablet by mouth daily Take daily first thing in the morning on an empty stomach. 20   Fauzia Baker MD   estradiol (ESTRACE) 2 MG tablet TAKE 1 TABLET BY MOUTH DAILY 16   Isiah Newsome MD   fluticasone [facility]) 50 MCG/ACT nasal spray 2 sprays by Nasal route daily  Patient not taking: Reported on 2021   Isiah Newsome MD   azelastine (ASTELIN) 0.1 % nasal spray 2 sprays by Nasal route 2 times daily  Patient not taking: Reported on 2021   Isiah Newsome MD   PROCTOSOL HC 2.5 % rectal cream Apply BID as needed for hemorrhoids  Patient not taking: Reported on 2021   Isiah Newsome MD   naproxen (NAPROSYN) 500 MG tablet  6/23/15   Historical Provider, MD   Vitamin D (CHOLECALCIFEROL) 1000 UNITS CAPS capsule Take 1,000 Units by mouth daily    Historical Provider, MD       Current medications:    Current Facility-Administered Medications   Medication Dose Route Frequency Provider Last Rate Last Admin    0.9 % sodium chloride infusion   Intravenous Continuous Fauzia Baker  mL/hr at 21 0807 New Bag at 21 0807       Allergies:     Allergies   Allergen Reactions    Codeine Itching and Rash    Morphine      migraines  Sulfa Antibiotics        Problem List:    Patient Active Problem List   Diagnosis Code    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       Past Medical History:        Diagnosis Date    Allergic rhinitis     Asthma     Endometriosis     GERD (gastroesophageal reflux disease)     Osteopenia 2013    [facility]) [facility] [address]     Sjogren's disease ([facility] [address]        Past Surgical History:        Procedure Laterality Date    COLONOSCOPY  10/17/2016    Dr Alec Barcenas, 5 yr recall    ENDOSCOPY, COLON, DIAGNOSTIC      HYSTERECTOMY      TUBAL LIGATION      UPPER GASTROINTESTINAL ENDOSCOPY N/A 9/4/2020    Dr Samantha Cornelius-[id number], gastritis       Social History:    Social History     Tobacco Use    Smoking status: Never Smoker    Smokeless tobacco: Never Used   Substance Use Topics    Alcohol use: No     Alcohol/week: 0.0 standard drinks                                Counseling given: Not Answered      Vital Signs (Current):   Vitals:    02/05/21 0801   BP: 112/61   Pulse: 62   Resp: 18   Temp: 97.2 °F (36.2 °C)   TempSrc: Temporal   SpO2: 97%   Weight: 130 lb (59 kg)   Height: 5' 3\" (1.6 m)                                              BP Readings from Last 3 Encounters:   02/05/21 112/61   01/27/21 118/74   09/04/20 (!) 98/46       NPO Status: Time of last liquid consumption: 0000                        Time of last solid consumption: 1600                        Date of last liquid consumption: 02/05/21                        Date of last solid food consumption: 02/03/21    BMI:   Wt Readings from Last 3 Encounters:   02/05/21 130 lb (59 kg)   01/27/21 135 lb (61.2 kg)   09/04/20 137 lb (62.1 kg)     Body mass index is 23.03 kg/m².     CBC:   Lab Results   Component Value Date    WBC 6.1 01/27/2021    RBC 4.34 01/27/2021    HGB 13.5 01/27/2021    HCT 40.8 01/27/2021    MCV 94.0 01/27/2021    RDW 12.7 01/27/2021     01/27/2021       CMP:   Lab Results Component Value Date     01/27/2021    K 3.8 01/27/2021     01/27/2021    CO2 29 01/27/2021    BUN 15 01/27/2021    CREATININE 0.7 01/27/2021    GFRAA >59 01/27/2021    LABGLOM >60 01/27/2021    GLUCOSE 78 01/27/2021    PROT 7.3 01/27/2021    CALCIUM 9.1 01/27/2021    BILITOT <0.2 01/27/2021    ALKPHOS 81 01/27/2021    AST 29 01/27/2021    ALT 40 01/27/2021       POC Tests: No results for input(s): POCGLU, POCNA, POCK, POCCL, POCBUN, POCHEMO, POCHCT in the last 72 hours. Coags: No results found for: PROTIME, INR, APTT    HCG (If Applicable): No results found for: PREGTESTUR, PREGSERUM, HCG, HCGQUANT     ABGs: No results found for: PHART, PO2ART, JJX1ASU, MOW2LWG, BEART, L1BQKVEB     Type & Screen (If Applicable):  No results found for: LABABO, LABRH    Drug/Infectious Status (If Applicable):  No results found for: HIV, HEPCAB    COVID-19 Screening (If Applicable):   Lab Results   Component Value Date    COVID19 Not Detected 02/03/2021         Anesthesia Evaluation  Patient summary reviewed and Nursing notes reviewed no history of anesthetic complications:   Airway: Mallampati: III        Dental:          Pulmonary:   (+) asthma:                            Cardiovascular:Negative CV ROS  Exercise tolerance: good (>4 METS),           Rhythm: regular  Rate: normal           Beta Blocker:  Not on Beta Blocker         Neuro/Psych:   Negative Neuro/Psych ROS              GI/Hepatic/Renal:   (+) GERD: poorly controlled,          ROS comment: AVM. Endo/Other:    (+) : arthritis:., .                 Abdominal:           Vascular: negative vascular ROS. Anesthesia Plan      general and TIVA     ASA 2       Induction: intravenous. Anesthetic plan and risks discussed with patient. Plan discussed with CRNA.                   Sly Alarcon, TANA - CRNA   2/5/2021

## 2021-02-05 NOTE — H&P
Patient Name: Rafi Schneider  : 1970  MRN: [id number]  DATE: 21    Allergies: Allergies   Allergen Reactions    Codeine Itching and Rash    Morphine      migraines    Sulfa Antibiotics         ENDOSCOPY  History and Physical    Procedure:    [x] Diagnostic Colonoscopy       [] Screening Colonoscopy  [x] EGD      [] ERCP      [] EUS       [] Other    [x] Previous office notes/History and Physical reviewed from the patients chart. Please see EMR for further details of HPI. I have examined the patient's status immediately prior to the procedure and:      Indications/HPI:    [x]Abdominal Pain   []Cancer- GI/Lung     []Fhx of colon CA/polyps  []History of Polyps  []Barretts            []Melena  []Abnormal Imaging              [x]Dysphagia              []Persistent Pneumonia   []Anemia                            []Food Impaction        []History of Polyps  [] GI Bleed             []Pulmonary nodule/Mass   []Change in bowel habits [x]Heartburn/Reflux  []Rectal Bleed (BRBPR)  []Chest Pain - Non Cardiac []Heme (+) Stool []Ulcers  []Constipation  []Hemoptysis  []Varices  []Diarrhea  []Hypoxemia    []Nausea/Vomiting   []Screening   []Crohns/Colitis  []Other:     Anesthesia:   [x] MAC [] Moderate Sedation   [] General   [] None     ROS: 12 pt Review of Symptoms was negative unless mentioned above    Medications:   Prior to Admission medications    Medication Sig Start Date End Date Taking? Authorizing Provider   leflunomide (ARAVA) 20 MG tablet Take 20 mg by mouth daily    Historical Provider, MD   hydroxychloroquine (PLAQUENIL) 200 MG tablet Take 200 mg by mouth daily    Historical Provider, MD   prednisoLONE 5 MG TABS Take by mouth    Historical Provider, MD   pantoprazole (PROTONIX) 40 MG tablet Take 1 tablet by mouth daily Take daily first thing in the morning on an empty stomach.  20   Maki Najera MD   estradiol (ESTRACE) 2 MG tablet TAKE 1 TABLET BY MOUTH DAILY 16   Janene Koch MD fluticasone (FLONASE) 50 MCG/ACT nasal spray 2 sprays by Nasal route daily  Patient not taking: Reported on 2021   Ana Govea MD   azelastine (ASTELIN) 0.1 % nasal spray 2 sprays by Nasal route 2 times daily  Patient not taking: Reported on 2021   Ana Govea MD   PROCTOSOL HC 2.5 % rectal cream Apply BID as needed for hemorrhoids  Patient not taking: Reported on 2021   Ana Govea MD   naproxen (NAPROSYN) 500 MG tablet  6/23/15   Historical Provider, MD   Vitamin D (CHOLECALCIFEROL) 1000 UNITS CAPS capsule Take 1,000 Units by mouth daily    Historical Provider, MD       Past Medical History:  Past Medical History:   Diagnosis Date    Allergic rhinitis     Asthma     Endometriosis     GERD (gastroesophageal reflux disease)     Osteopenia     [facility] [address]     Sjogren's disease ([facility] [address]        Past Surgical History:  Past Surgical History:   Procedure Laterality Date    COLONOSCOPY  10/17/2016    Dr Mita Soni, 5 yr recall    ENDOSCOPY, COLON, DIAGNOSTIC      HYSTERECTOMY      TUBAL LIGATION      UPPER GASTROINTESTINAL ENDOSCOPY N/A 2020    Dr Fleet Apgar Jones-w/[id number], gastritis       Social History:  Social History     Tobacco Use    Smoking status: Never Smoker    Smokeless tobacco: Never Used   Substance Use Topics    Alcohol use: No     Alcohol/week: 0.0 standard drinks    Drug use: No       Vital Signs:   Vitals:    21 0801   BP: 112/61   Pulse: 62   Resp: 18   Temp: 97.2 °F (36.2 °C)   SpO2: 97%        Physical Exam:  Cardiac:  [x]WNL  []Comments:  Pulmonary:  [x]WNL   []Comments:  Neuro/Mental Status:  [x]WNL  []Comments:  Abdominal:  [x]WNL    []Comments:  Other:   []WNL  []Comments:    Informed Consent:  The risks and benefits of the procedure have been discussed with either the patient or if they cannot consent, their representative.     Assessment: Patient examined and appropriate for planned sedation and procedure. Plan:  Proceed with planned sedation and procedure as above. Rome Denver, am scribing for and in the presence of Dr. Eliseo Kirby MD.  Electronically signed by Gustavo Medellin RN on 2/5/2021 at 8:11 AM    I personally performed the services described in this documentation as scribed by Antonino Rivas, and it appears accurate and complete.      Eliseo Kirby MD  2/5/2021

## 2021-02-05 NOTE — OP NOTE
Patient: Johanna Brown : 1970  Med Rec#: [id number] Acc#: [id number]   Primary Care Provider Olvin ScottHeather Hammond    Date of Procedure:  2021    Endoscopist: Chely Alonso MD    Referring Provider: Olvin Scott. Granados    Operation Performed: Colonoscopy     Indications: abdominal pain    Anesthesia:  Sedation was administered by anesthesia who monitored the patient during the procedure. I met with Johanna Brown prior to procedure. We discussed the procedure itself, and I have discussed the risks of endoscopy (including-- but not limited to-- pain, discomfort, bleeding potentially requiring second endoscopic procedure and/or blood transfusion, organ perforation requiring operative repair, damage to organs near the colon, infection, aspiration, cardiopulmonary/allergic reaction), benefits, indications to endoscopy. Additionally, we discussed options other than colonoscopy. The patient expressed understanding. All questions answered. The patient decided to proceed with the procedure. Signed informed consent was placed on the chart. Blood Loss: minimal    Withdrawal time: > 6 min  Bowel Prep: adequate     Complications: no immediate complications    DESCRIPTION OF PROCEDURE:     A time out was performed. After written informed consent was obtained, the patient was placed in the left lateral position. The perianal area was inspected, and a digital rectal exam was performed. A rectal exam was performed: normal tone, no palpable lesions. At this point, a forward viewing Olympus colonoscope was inserted into the anus and carefully advanced to the cecum. The cecum was identified by the ileocecal valve and the appendiceal orifice. The colonoscope was then slowly withdrawn with careful inspection of the mucosa in a linear and circumferential fashion. The scope was retroflexed in the rectum. Suction was utilized during the procedure to remove as much air as possible from the bowel.  The colonoscope was removed from the patient, and the procedure was terminated. Findings are listed below. Findings: The mucosa appeared normal throughout the entire examined colon. Retroflexion in the rectum was normal and revealed no further abnormalities. Recommendations:  1. Repeat colonoscopy: 10 years for CRC screening      Findings and recommendations were discussed w/ the patient. A copy of the images was provided. Fouzia Pineda am scribing for and in the presence of Dr. Teresia Osgood, MD.  Electronically signed by Blessing Guevara RN on 2/5/2021 at 8:27 AM    I personally performed the services described in this documentation as scribed by Chasidy Shane, and it appears accurate and complete.      Teresia Osgood, MD  2/5/2021

## 2021-02-05 NOTE — OP NOTE
Endoscopic Procedure Note    Patient: Myra Blunt : 1970  Med Rec#: [id number] Acc#: [id number]     Primary Care Provider Juanita Granados    Endoscopist: Dliip Zaidi MD    Date of Procedure:  2021    Procedure:   1. EGD with biopsy  2. EGD with wire-guided dilation- 46 F    Indications:   1. Dysphagia   2. Abdominal pain    Anesthesia:  Sedation was administered by anesthesia who monitored the patient during the procedure. Estimated Blood Loss: minimal    Procedure:   After reviewing the patient's chart and obtaining informed consent, the patient was placed in the left lateral decubitus position. A forward-viewing Olympus endoscope was lubricated and inserted through the mouth into the oropharynx. Under direct visualization, the upper esophagus was intubated. The scope was advanced to the level of the third portion of duodenum. Scope was slowly withdrawn with careful inspection of the mucosal surfaces. The scope was retroflexed for inspection of the gastric fundus and incisura. Findings and maneuvers are listed in impression below. The patient tolerated the procedure well. The scope was removed. There were no immediate complications. Findings:   Esophagus: Abnormal: there was a questionable 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, dilated due to patient symptoms. A guidewire was placed through the endoscope and the endoscope was removed. A 54 [place] savory dilator was advanced down the length of the esophagus using a fixed-point wire technique. The dilator and guidewire were removed. The patient tolerated the procedure well. There is no hiatal hernia present. Stomach:  Abnormal: Mild mucosal changes suggestive of gastritis noted -  Gastric biopsies were taken from the antrum and body to rule out Helicobacter pylori infection. Duodenum: Normal      IMPRESSION:  1. Gastritis- biopsied   2. S/p wire-guided dilation- 47 F     RECOMMENDATIONS:    1.   Await path results, the patient will be contacted in 7-10 days with biopsy results. 2.  Continue PPI  3. Repeat dilation as needed  4. Follow up in office with TANA Aguilar in 6-8 weeks. The results were discussed with the patient and family. A copy of the images obtained were given to the patient. Jose Landin am scribing for and in the presence of Dr. Ho Silveira MD.  Electronically signed by Pk Rosenberg RN on 2/5/2021 at 8:13 AM    I personally performed the services described in this documentation as scribed by Gilberto Mora, and it appears accurate and complete.      Promise Espinoza MD  2/5/2021

## 2021-02-08 RX ORDER — DICYCLOMINE HYDROCHLORIDE 10 MG/1
10 CAPSULE ORAL 4 TIMES DAILY
Qty: 120 CAPSULE | Refills: 5 | Status: SHIPPED | OUTPATIENT
Start: 2021-02-08

## 2021-02-19 ENCOUNTER — TELEPHONE (OUTPATIENT)
Dept: SURGERY | Age: 51
End: 2021-02-19

## 2021-02-19 NOTE — TELEPHONE ENCOUNTER
Called patient and left vm message that patient had been scheduled to see Dr. Allie Carlos on 3/16/21 at 3:00.

## 2021-03-19 ENCOUNTER — VIRTUAL VISIT (OUTPATIENT)
Dept: GASTROENTEROLOGY | Age: 51
End: 2021-03-19
Payer: OTHER GOVERNMENT

## 2021-03-19 DIAGNOSIS — K22.2 ESOPHAGEAL STRICTURE: Primary | ICD-10-CM

## 2021-03-19 DIAGNOSIS — R10.9 ABDOMINAL CRAMPING: ICD-10-CM

## 2021-03-19 DIAGNOSIS — K29.50 CHRONIC GASTRITIS WITHOUT BLEEDING, UNSPECIFIED GASTRITIS TYPE: ICD-10-CM

## 2021-03-19 PROCEDURE — 99213 OFFICE O/P EST LOW 20 MIN: CPT | Performed by: NURSE PRACTITIONER

## 2021-03-19 RX ORDER — PANTOPRAZOLE SODIUM 40 MG/1
40 TABLET, DELAYED RELEASE ORAL 2 TIMES DAILY
Qty: 60 TABLET | Refills: 11 | Status: SHIPPED | OUTPATIENT
Start: 2021-03-19 | End: 2021-07-23 | Stop reason: SDUPTHER

## 2021-03-19 ASSESSMENT — ENCOUNTER SYMPTOMS
CHOKING: 0
NAUSEA: 0
DIARRHEA: 0
COUGH: 0
ABDOMINAL DISTENTION: 0
ANAL BLEEDING: 0
TROUBLE SWALLOWING: 1
CONSTIPATION: 0
VOMITING: 0
SHORTNESS OF BREATH: 0
RECTAL PAIN: 0
ABDOMINAL PAIN: 1
BLOOD IN STOOL: 0

## 2021-03-19 NOTE — PROGRESS NOTES
3/19/2021    TELEHEALTH EVALUATION -- Audio/Visual (During FANVL-74 public health emergency)    HPI:    Miguel Borja (:  1970) has requested an audio/video evaluation for the following concern(s):  Chief Complaint   Patient presents with    Follow Up After Procedure       Miguel Borja is here for follow up after EGD & Colonoscopy on 2021. During her last visit, she had c/o abdominal pain, trouble swallowing, and nausea. Today, she reports symptoms have improved. She is currently taking Protonix daily and Dicyclomine prn. She reports the Dicyclomine is helping with the abdominal pain. She is concerned with esophageal dilation and recurrent issues in swallowing. Colonoscopy Findings 2021:   The mucosa appeared normal throughout the entire examined colon. Retroflexion in the rectum was normal and revealed no further abnormalities. Recommendations:  1. Repeat colonoscopy: 10 years for CRC screening    EGD Findings 2021:   Esophagus: Abnormal: there was a questionable 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, dilated due to patient symptoms. A guidewire was placed through the endoscope and the endoscope was removed. A 54 French savory dilator was advanced down the length of the esophagus using a fixed-point wire technique. The dilator and guidewire were removed. The patient tolerated the procedure well. There is no hiatal hernia present. Stomach:  Abnormal: Mild mucosal changes suggestive of gastritis noted -  Gastric biopsies were taken from the antrum and body to rule out Helicobacter pylori infection. Duodenum: Normal  IMPRESSION:  1. Gastritis- biopsied   2. S/p wire-guided dilation- 46 F  RECOMMENDATIONS:    1. Await path results, the patient will be contacted in 7-10 days with biopsy results. 2.  Continue PPI  3. Repeat dilation as needed  4. Follow up in office with TANA Cho in 6-8 weeks.      FINAL DIAGNOSIS:   Stomach, gastric biopsy:   Benign fundic and antral-type gastric mucosa, no diagnostic abnormality. Negative for increased inflammation. Negative for active inflammation. Negative for Helicobacter pylori organisms by immunohistochemical stain. Negative for intestinal metaplasia. Negative for dysplasia. All scope and pathology reports were reviewed and discussed with patient. All questions answered, pt verbalized understanding. Review of Systems   Constitutional: Negative for activity change, appetite change, fatigue, fever and unexpected weight change. HENT: Positive for trouble swallowing (improved). Respiratory: Negative for cough, choking and shortness of breath. Cardiovascular: Negative for chest pain. Gastrointestinal: Positive for abdominal pain (controlled). Negative for abdominal distention, anal bleeding, blood in stool, constipation, diarrhea, nausea, rectal pain and vomiting. Allergic/Immunologic: Negative for food allergies. All other systems reviewed and are negative. Prior to Visit Medications    Medication Sig Taking? Authorizing Provider   pantoprazole (PROTONIX) 40 MG tablet Take 1 tablet by mouth 2 times daily Take 2 times daily for 3 months then decrease to once a day.  Yes TANA Ulloa NP   dicyclomine (BENTYL) 10 MG capsule Take 1 capsule by mouth 4 times daily  Jacques Quintanilla MD   leflunomide (ARAVA) 20 MG tablet Take 20 mg by mouth daily  Historical Provider, MD   hydroxychloroquine (PLAQUENIL) 200 MG tablet Take 200 mg by mouth daily  Historical Provider, MD   prednisoLONE 5 MG TABS Take by mouth  Historical Provider, MD   estradiol (ESTRACE) 2 MG tablet TAKE 1 TABLET BY MOUTH DAILY  Zoe Aaron MD   fluticasone (FLONASE) 50 MCG/ACT nasal spray 2 sprays by Nasal route daily  Patient not taking: Reported on 1/27/2021  Zoe Aaron MD   azelastine (ASTELIN) 0.1 % nasal spray 2 sprays by Nasal route 2 times daily  Patient not taking: Reported on 1/27/2021  Zoe Aaron MD   PROCTOSOL HC 2.5 % rectal cream Apply BID as needed for hemorrhoids  Patient not taking: Reported on 1/27/2021  Valentina Mccarthy MD   naproxen (NAPROSYN) 500 MG tablet   Historical Provider, MD   Vitamin D (CHOLECALCIFEROL) 1000 UNITS CAPS capsule Take 1,000 Units by mouth daily  Historical Provider, MD       Social History     Tobacco Use    Smoking status: Never Smoker    Smokeless tobacco: Never Used   Substance Use Topics    Alcohol use: No     Alcohol/week: 0.0 standard drinks    Drug use: No        Allergies   Allergen Reactions    Codeine Itching and Rash    Morphine      migraines    Sulfa Antibiotics    ,   Past Medical History:   Diagnosis Date    Allergic rhinitis     Asthma     Endometriosis     GERD (gastroesophageal reflux disease)     Osteopenia 2013    [facility] [address]     Sjogren's disease ([facility] [address]    ,   Past Surgical History:   Procedure Laterality Date    COLONOSCOPY  10/17/2016    Dr Kar Zarate, 5 yr recall    COLONOSCOPY N/A 02/05/2021    Dr Travis Sauceda yr recall    ENDOSCOPY, COLON, DIAGNOSTIC      HYSTERECTOMY      TUBAL LIGATION      UPPER GASTROINTESTINAL ENDOSCOPY N/A 09/04/2020    Dr Ishaan Cornelius-[id number], gastritis    UPPER GASTROINTESTINAL ENDOSCOPY N/A 02/05/2021    Dr TEMITOPE Mcneill/[id number]-Bklfjpjzayia 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, gastritis-Benign    UPPER GASTROINTESTINAL ENDOSCOPY N/A 02/05/2021    Dr TEMITOPE Mcneill/[id number] 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, gastritis-Benign   ,   Social History     Tobacco Use    Smoking status: Never Smoker    Smokeless tobacco: Never Used   Substance Use Topics    Alcohol use: No     Alcohol/week: 0.0 standard drinks    Drug use: No   ,   Family History   Problem Relation Age of Onset    Uterine Cancer Mother 22    Stomach Cancer Mother     Breast Cancer Maternal Grandmother 28    Ovarian Cancer Maternal Aunt 28    Cancer Maternal Uncle 27        melanoma    Cancer Maternal Uncle liver       PHYSICAL EXAMINATION:  [ INSTRUCTIONS:  \"[x]\" Indicates a positive item  \"[]\" Indicates a negative item  -- DELETE ALL ITEMS NOT EXAMINED]  Vital Signs: (As obtained by patient/caregiver or practitioner observation)    Blood pressure-  Heart rate-    Respiratory rate-    Temperature-  Pulse oximetry-     Constitutional: [x] Appears well-developed and well-nourished [x] No apparent distress      [] Abnormal-   Mental status  [x] Alert and awake  [x] Oriented to person/place/time [x]Able to follow commands      Eyes:  EOM    [x]  Normal  [] Abnormal-  Sclera  [x]  Normal  [] Abnormal -         Discharge [x]  None visible  [] Abnormal -    HENT:   [x] Normocephalic, atraumatic. [] Abnormal   [x] Mouth/Throat: Mucous membranes are moist.     External Ears [x] Normal  [] Abnormal-     Neck: [x] No visualized mass     Pulmonary/Chest: [x] Respiratory effort normal.  [x] No visualized signs of difficulty breathing or respiratory distress        [] Abnormal-      Musculoskeletal:   [x] Normal gait with no signs of ataxia         [x] Normal range of motion of neck        [] Abnormal-       Neurological:        [x] No Facial Asymmetry (Cranial nerve 7 motor function) (limited exam to video visit)          [x] No gaze palsy        [] Abnormal-         Skin:        [x] No significant exanthematous lesions or discoloration noted on facial skin         [] Abnormal-            Psychiatric:       [x] Normal Affect [x] No Hallucinations        [] Abnormal-     Other pertinent observable physical exam findings-     ASSESSMENT/PLAN:  1. Esophageal stricture  2. Abdominal cramping  3. Chronic gastritis without bleeding, unspecified gastritis type    - Continue Dicyclomine prn  - Increase Protonix to BID x 3 months, then daily  - Follow up in 4-6 months or sooner if needed  - Avoid NSAIDs  - Anti-reflux precautions  - Call with any questions or concerns    Return in about 4 months (around 7/19/2021).     Rafi Schneider, was evaluated through a synchronous (real-time) audio-video encounter. The patient (or guardian if applicable) is aware that this is a billable service. Verbal consent to proceed has been obtained within the past 12 months. The visit was conducted pursuant to the emergency declaration under the [address] [place] [address], [address] [place] [address] authority and the [email or web address] and [employer] General Act. Patient identification was verified, and a caregiver was present when appropriate. The patient was located in a state where the provider was credentialed to provide care. Total time spent on this encounter: Not billed by time    --TANA Barreto NP on 3/19/2021 at 11:24 AM    An electronic signature was used to authenticate this note.

## 2021-03-24 ENCOUNTER — HOSPITAL ENCOUNTER (OUTPATIENT)
Dept: WOMENS IMAGING | Age: 51
Discharge: HOME | End: 2021-03-24
Payer: OTHER GOVERNMENT

## 2021-03-24 DIAGNOSIS — Z12.31 ENCOUNTER FOR SCREENING MAMMOGRAM FOR MALIGNANT NEOPLASM OF BREAST: ICD-10-CM

## 2021-03-24 PROCEDURE — 77067 SCR MAMMO BI INCL CAD: CPT

## 2021-03-31 ENCOUNTER — TELEPHONE (OUTPATIENT)
Dept: NEUROSURGERY | Age: 51
End: 2021-03-31

## 2021-03-31 NOTE — TELEPHONE ENCOUNTER
Called patient. Referral was just sent over today, 3/31/21. Patient is on the schedule for 5/18/21 @ 1 with [facility]. She may call back and switch to EG to get in sooner.

## 2021-05-18 ENCOUNTER — OFFICE VISIT (OUTPATIENT)
Dept: NEUROSURGERY | Age: 51
End: 2021-05-18
Payer: OTHER GOVERNMENT

## 2021-05-18 VITALS
BODY MASS INDEX: 23.39 KG/M2 | OXYGEN SATURATION: 97 % | HEIGHT: 63 IN | SYSTOLIC BLOOD PRESSURE: 116 MMHG | WEIGHT: 132 LBS | DIASTOLIC BLOOD PRESSURE: 68 MMHG | HEART RATE: 79 BPM

## 2021-05-18 DIAGNOSIS — R20.0 NUMBNESS: ICD-10-CM

## 2021-05-18 DIAGNOSIS — R53.1 WEAKNESS: ICD-10-CM

## 2021-05-18 DIAGNOSIS — R51.9 HEADACHE, UNSPECIFIED HEADACHE TYPE: ICD-10-CM

## 2021-05-18 DIAGNOSIS — G89.29 CHRONIC BILATERAL LOW BACK PAIN WITHOUT SCIATICA: ICD-10-CM

## 2021-05-18 DIAGNOSIS — M54.50 CHRONIC BILATERAL LOW BACK PAIN WITHOUT SCIATICA: ICD-10-CM

## 2021-05-18 DIAGNOSIS — R41.3 MEMORY LOSS: ICD-10-CM

## 2021-05-18 DIAGNOSIS — R20.0 NUMBNESS: Primary | ICD-10-CM

## 2021-05-18 LAB
ALBUMIN SERPL-MCNC: 4.6 G/DL (ref 3.5–5.2)
ALP BLD-CCNC: 73 U/L (ref 35–104)
ALT SERPL-CCNC: 104 U/L (ref 5–33)
ANION GAP SERPL CALCULATED.3IONS-SCNC: 13 MMOL/L (ref 7–19)
AST SERPL-CCNC: 56 U/L (ref 5–32)
BASOPHILS ABSOLUTE: 0 K/UL (ref 0–0.2)
BASOPHILS RELATIVE PERCENT: 0.6 % (ref 0–1)
BILIRUB SERPL-MCNC: 0.3 MG/DL (ref 0.2–1.2)
BUN BLDV-MCNC: 13 MG/DL (ref 6–20)
C-REACTIVE PROTEIN: <0.3 MG/DL (ref 0–0.5)
CALCIUM SERPL-MCNC: 9.6 MG/DL (ref 8.6–10)
CHLORIDE BLD-SCNC: 98 MMOL/L (ref 98–111)
CO2: 28 MMOL/L (ref 22–29)
CREAT SERPL-MCNC: 0.6 MG/DL (ref 0.5–0.9)
EOSINOPHILS ABSOLUTE: 0.1 K/UL (ref 0–0.6)
EOSINOPHILS RELATIVE PERCENT: 1.1 % (ref 0–5)
GFR AFRICAN AMERICAN: >59
GFR NON-AFRICAN AMERICAN: >60
GLUCOSE BLD-MCNC: 85 MG/DL (ref 74–109)
HCT VFR BLD CALC: 40.2 % (ref 37–47)
HEMOGLOBIN: 13.4 G/DL (ref 12–16)
HIV-1 P24 AG: NORMAL
IMMATURE GRANULOCYTES #: 0 K/UL
LYMPHOCYTES ABSOLUTE: 2.1 K/UL (ref 1.1–4.5)
LYMPHOCYTES RELATIVE PERCENT: 33.3 % (ref 20–40)
MCH RBC QN AUTO: 30.7 PG (ref 27–31)
MCHC RBC AUTO-ENTMCNC: 33.3 G/DL (ref 33–37)
MCV RBC AUTO: 92.2 FL (ref 81–99)
MONOCYTES ABSOLUTE: 0.6 K/UL (ref 0–0.9)
MONOCYTES RELATIVE PERCENT: 8.8 % (ref 0–10)
NEUTROPHILS ABSOLUTE: 3.6 K/UL (ref 1.5–7.5)
NEUTROPHILS RELATIVE PERCENT: 55.9 % (ref 50–65)
PDW BLD-RTO: 12.9 % (ref 11.5–14.5)
PLATELET # BLD: 204 K/UL (ref 130–400)
PMV BLD AUTO: 11.6 FL (ref 9.4–12.3)
POTASSIUM SERPL-SCNC: 3.6 MMOL/L (ref 3.5–5)
RAPID HIV 1&2: NORMAL
RBC # BLD: 4.36 M/UL (ref 4.2–5.4)
SEDIMENTATION RATE, ERYTHROCYTE: 8 MM/HR (ref 0–25)
SODIUM BLD-SCNC: 139 MMOL/L (ref 136–145)
T4 FREE: 1.14 NG/DL (ref 0.93–1.7)
TOTAL CK: 88 U/L (ref 26–192)
TOTAL PROTEIN: 7.4 G/DL (ref 6.6–8.7)
TSH SERPL DL<=0.05 MIU/L-ACNC: 1.57 UIU/ML (ref 0.27–4.2)
VITAMIN B-12: 457 PG/ML (ref 211–946)
WBC # BLD: 6.4 K/UL (ref 4.8–10.8)

## 2021-05-18 PROCEDURE — 99204 OFFICE O/P NEW MOD 45 MIN: CPT | Performed by: PSYCHIATRY & NEUROLOGY

## 2021-05-18 NOTE — PROGRESS NOTES
[facility] Neurology Office Note      Patient:   Magnolia Mejia  MR#:    [id number]  Account Number:                         YOB: 1970  Date of Evaluation:  5/18/2021  Time of Note:                          1:14 PM  Primary/Referring Physician:  Mallory Dorsey DO   Consulting Physician:  Mel Garvin DO    NEW PATIENT CONSULTATION    Chief Complaint   Patient presents with    Numbness     New patient referral has numbness in hands arms also legs     Memory Loss    Results     MRI     Extremity Weakness       HISTORY OF PRESENT ILLNESS    Magnolia Mejia is a 45y.o. year old female here for numbness, headaches, memory loss, and weakness. Symptoms started a year or so ago and have progressively worsened. She notes memory loss, primarily short term. Also notes widespread numbness and tingling and also noting worsening weakness in the arms and legs. Some gait difficulty. Noted as well. She does note some neck pain which waxes and wane. She notes a history of spina bifida occulta. MRI brain with mild nonspecific white matter changes otherwise negative. Denies anxiety or depression. Does have an underlying history of RA and possible Sjogren's. On Leflunomide and prednisone currently. Has stopped plaquenil. Followed at [facility].      Past Medical History:   Diagnosis Date    Allergic rhinitis     Asthma     Endometriosis     GERD (gastroesophageal reflux disease)     Osteopenia 2013    [facility] [address]     Sjogren's disease [address] [address] [facility]        Past Surgical History:   Procedure Laterality Date    COLONOSCOPY  10/17/2016    Dr Sasha Rinaldi, 5 yr recall    COLONOSCOPY N/A 02/05/2021    Dr Sun Herd yr recall    ENDOSCOPY, COLON, DIAGNOSTIC      HYSTERECTOMY  1996    age 31    OVARY REMOVAL Bilateral 1996    age 31   [address] ENDOSCOPY N/A 09/04/2020    Dr Isaac Cornelius-w/[id number]-Uhafkeeobhg, gastritis    UPPER GASTROINTESTINAL ENDOSCOPY N/A 02/05/2021    Dr TEMITOPE Cornelius-w/[id number]-Xpslsofmaihf 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, gastritis-Benign    UPPER GASTROINTESTINAL ENDOSCOPY N/A 02/05/2021    Dr TEMITOPE Cornelius-w/lec-86S-Gpcbpeqymggc 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, gastritis-Benign       Family History   Problem Relation Age of Onset    Uterine Cancer Mother 22    Stomach Cancer Mother     Breast Cancer Maternal Grandmother 28    Ovarian Cancer Maternal Aunt 28    Cancer Maternal Uncle 27        melanoma    Cancer Maternal Uncle         liver       Social History     Socioeconomic History    Marital status:      Spouse name: Not on file    Number of children: Not on file    Years of education: Not on file    Highest education level: Not on file   Occupational History    Not on file   Tobacco Use    Smoking status: Never Smoker    Smokeless tobacco: Never Used   Vaping Use    Vaping Use: Never used   Substance and Sexual Activity    Alcohol use: No     Alcohol/week: 0.0 standard drinks    Drug use: No    Sexual activity: Yes     Partners: Male   Other Topics Concern    Not on file   Social History Narrative    Not on file     Social Determinants of Health     Financial Resource Strain:     Difficulty of Paying Living Expenses:    Food Insecurity:     Worried About Running Out of Food in the Last Year:     [address] in the Last Year:    Transportation Needs:     Lack of Transportation (Medical):      Lack of Transportation (Non-Medical):    Physical Activity:     Days of Exercise per Week:     Minutes of Exercise per Session:    Stress:     Feeling of Stress :    Social Connections:     Frequency of Communication with Friends and Family:     Frequency of Social Gatherings with Friends and Family:     Attends Christian Services:     Active Member of Clubs or Organizations:     Attends Club or Organization Meetings:     Marital Status:    Intimate Partner Violence:     Fear of Current []Constipation  []Diarrhea  []Dark Bloody Stools  [x] Denies all unless marked  Psychiatric/Behavioral:[] Depression [] Anxiety [x] Denies all unless marked  Genitourinary:   [] Frequency  [] Urgency  [] Incontinence [] Pain with Urination  [x] Denies all unless marked  Extremities: []Pain  [x]Swelling  [x] Denies all unless marked  Musculoskeletal: [] Muscle Pain  [] Joint Pain  [] Arthritis [] Muscle Cramps [] Muscle Twitches  [x] Denies all unless marked  Sleep: [] Insomnia [] Snoring [] Restless Legs [] Sleep Apnea  [] Daytime Sleepiness  [x] Denies all unless marked  Skin:[] Rash [] Skin Discoloration [x] Denies all unless marked   Neurological: [x]Visual Disturbance/Memory Loss [x] Loss of Balance [] Slurred Speech/Weakness [] Seizures  [] Vertigo/Dizziness [x] Denies all unless marked     I have reviewed the above ROS with the patient and agree with the ROS as documented above. PHYSICAL EXAM    Constitutional -   /68   Pulse 79   Ht 5' 3\" (1.6 m)   Wt 132 lb (59.9 kg)   SpO2 97%   BMI 23.38 kg/m²   General appearance: No acute distress   EYES -   Conjunctiva normal  Pupillary exam as below, see CN exam in the neurologic exam  ENT-    No scars, masses, or lesions over external nose or ears  Hearing normal bilaterally to finger rub  Cardiovascular -   No clubbing, cyanosis, or edema   Pulmonary-   Good expansion, normal effort without use of accessory muscles  Musculoskeletal -   No significant wasting of muscles noted  Gait as below, see gait exam in the neurologic exam  Muscle strength, tone, stability as below. No bony deformities  Skin -   Warm, dry, and intact to inspection and palpation.     No rash, erythema, or pallor  Psychiatric -   Mood, affect, and behavior appear normal    Memory as below see mental status examination in the neurologic exam    NEUROLOGICAL EXAM    Mental status   [x]Awake, alert, oriented   [x]Affect attention and concentration appear appropriate  []Recent and remote memory appears unremarkable  [x]Speech normal without dysarthria or aphasia, comprehension and repetition intact. COMMENTS: Mild cognitive loss   Cranial Nerves [x]No VF deficit to confrontation  [x]PERRLA, EOMI, no nystagmus, conjugate eye movements, no ptosis  [x]Face symmetric  [x]Facial sensation intact  [x]Tongue midline no atrophy or fasciculations present  [x]Palate midline, hearing to finger rub normal bilaterally  [x]Shoulder shrug and SCM testing normal bilaterally  COMMENTS:   Motor   []5/5 strength x 4 extremities  [x]Normal bulk and tone  [x]No tremor present  [x]No rigidity or bradykinesia noted  COMMENTS: 4/5 globally    Sensory  []Sensation intact to light touch, pin prick, vibration, and proprioception BLE  []Sensation intact to light touch, pin prick, vibration, and proprioception BUE  COMMENTS: Mild decreased LT, Vib LLE>RLE, PP ok. Coordination [x]FTN normal bilaterally   []HTS normal bilaterally  []MENDY normal bilaterally. COMMENTS:   Reflexes  [x]Symmetric and non-pathological, slightly brisk  [x]Toes down going bilaterally  [x]No clonus present  COMMENTS:   Gait                  [x]Normal steady gait    []Ataxic    []Spastic     []Magnetic     []Shuffling  COMMENTS:       LABS RECORD AND IMAGING REVIEW (As below and per HPI)    Lab Results   Component Value Date    WBC 6.1 01/27/2021    HGB 13.5 01/27/2021    HCT 40.8 01/27/2021    MCV 94.0 01/27/2021     01/27/2021     Lab Results   Component Value Date     01/27/2021    K 3.8 01/27/2021     01/27/2021    CO2 29 01/27/2021    BUN 15 01/27/2021    CREATININE 0.7 01/27/2021    GLUCOSE 78 01/27/2021    CALCIUM 9.1 01/27/2021    PROT 7.3 01/27/2021    LABALBU 4.6 01/27/2021    BILITOT <0.2 01/27/2021    ALKPHOS 81 01/27/2021    AST 29 01/27/2021    ALT 40 (H) 01/27/2021    LABGLOM >60 01/27/2021    GFRAA >59 01/27/2021     Primary records reviewed. MRI reviewed, minimal small vessel disease noted, nonspecific.

## 2021-05-20 LAB — RPR: NORMAL

## 2021-05-21 LAB
ARSENIC BLOOD: <10 UG/L
LEAD LEVEL BLOOD: <2 UG/DL
MERCURY BLOOD: <2.5 UG/L

## 2021-05-22 LAB
ALBUMIN SERPL-MCNC: 4.27 G/DL (ref 3.75–5.01)
ALPHA-1-GLOBULIN: 0.29 G/DL (ref 0.19–0.46)
ALPHA-2-GLOBULIN: 0.75 G/DL (ref 0.48–1.05)
BETA GLOBULIN: 0.67 G/DL (ref 0.48–1.1)
GAMMA GLOBULIN: 0.82 G/DL (ref 0.62–1.51)
PROTEIN ELECTROPHORESIS, SERUM: NORMAL
SPE/IFE INTERPRETATION: NORMAL
TOTAL PROTEIN: 6.8 G/DL (ref 6.3–8.2)

## 2021-05-25 ENCOUNTER — TELEPHONE (OUTPATIENT)
Dept: NEUROSURGERY | Age: 51
End: 2021-05-25

## 2021-05-25 NOTE — TELEPHONE ENCOUNTER
----- Message from Misti Mims DO sent at 5/19/2021 12:50 PM CDT -----  Liver enzyme elevation noted, needs to follow up with primary.

## 2021-05-26 LAB — LYME, EIA: 0.34 LIV (ref 0–1.2)

## 2021-05-27 ENCOUNTER — HOSPITAL ENCOUNTER (OUTPATIENT)
Dept: NEUROLOGY | Age: 51
Discharge: HOME | End: 2021-05-27
Payer: OTHER GOVERNMENT

## 2021-05-27 DIAGNOSIS — G89.29 CHRONIC BILATERAL LOW BACK PAIN WITHOUT SCIATICA: ICD-10-CM

## 2021-05-27 DIAGNOSIS — R20.0 NUMBNESS: ICD-10-CM

## 2021-05-27 DIAGNOSIS — R53.1 WEAKNESS: ICD-10-CM

## 2021-05-27 DIAGNOSIS — M54.50 CHRONIC BILATERAL LOW BACK PAIN WITHOUT SCIATICA: ICD-10-CM

## 2021-05-27 DIAGNOSIS — R41.3 MEMORY LOSS: ICD-10-CM

## 2021-05-27 DIAGNOSIS — R51.9 HEADACHE, UNSPECIFIED HEADACHE TYPE: ICD-10-CM

## 2021-05-27 PROCEDURE — 95886 MUSC TEST DONE W/N TEST COMP: CPT

## 2021-05-27 PROCEDURE — 95886 MUSC TEST DONE W/N TEST COMP: CPT | Performed by: PSYCHIATRY & NEUROLOGY

## 2021-05-27 PROCEDURE — 95913 NRV CNDJ TEST 13/> STUDIES: CPT

## 2021-05-27 PROCEDURE — 95913 NRV CNDJ TEST 13/> STUDIES: CPT | Performed by: PSYCHIATRY & NEUROLOGY

## 2021-06-29 ENCOUNTER — OFFICE VISIT (OUTPATIENT)
Dept: NEUROSURGERY | Age: 51
End: 2021-06-29
Payer: OTHER GOVERNMENT

## 2021-06-29 VITALS
WEIGHT: 132 LBS | BODY MASS INDEX: 23.39 KG/M2 | OXYGEN SATURATION: 97 % | HEIGHT: 63 IN | SYSTOLIC BLOOD PRESSURE: 112 MMHG | DIASTOLIC BLOOD PRESSURE: 70 MMHG | HEART RATE: 64 BPM

## 2021-06-29 DIAGNOSIS — R51.9 HEADACHE, UNSPECIFIED HEADACHE TYPE: ICD-10-CM

## 2021-06-29 DIAGNOSIS — G89.29 CHRONIC BILATERAL LOW BACK PAIN WITHOUT SCIATICA: ICD-10-CM

## 2021-06-29 DIAGNOSIS — M54.50 CHRONIC BILATERAL LOW BACK PAIN WITHOUT SCIATICA: ICD-10-CM

## 2021-06-29 DIAGNOSIS — R41.3 MEMORY LOSS: ICD-10-CM

## 2021-06-29 DIAGNOSIS — R20.0 NUMBNESS: Primary | ICD-10-CM

## 2021-06-29 DIAGNOSIS — R53.1 WEAKNESS: ICD-10-CM

## 2021-06-29 PROCEDURE — 99214 OFFICE O/P EST MOD 30 MIN: CPT | Performed by: PSYCHIATRY & NEUROLOGY

## 2021-06-29 RX ORDER — GABAPENTIN 300 MG/1
300 CAPSULE ORAL 2 TIMES DAILY
Qty: 60 CAPSULE | Refills: 5 | Status: SHIPPED | OUTPATIENT
Start: 2021-06-29 | End: 2022-04-07

## 2021-06-29 NOTE — PROGRESS NOTES
[facility] Neurology Office Note      Patient:   Magnolia Mejia  MR#:    [id number]  Account Number:                         YOB: 1970  Date of Evaluation:  6/29/2021  Time of Note:                          11:41 AM  Primary/Referring Physician:  Mallory Dorsey DO   Consulting Physician:  Mel Garvin DO    FOLLOW UP VISIT    Chief Complaint   Patient presents with    Results     6 week follow up from recent MRI        [facility]    Magnolia Mejia is a 45y.o. year old female here for numbness, headaches, memory loss, and weakness. Symptoms started a year or so ago and have progressively worsened. Doing about the same since last seen. She notes memory loss, primarily short term. Also notes widespread numbness and tingling and also noting worsening weakness in the arms and legs. Some gait difficulty noted as well. She does note some neck pain which waxes and wane. She notes a history of spina bifida occulta. MRI brain with mild nonspecific white matter changes otherwise negative. Denies anxiety or depression. Does have an underlying history of RA and possible Sjogren's. Now off Leflunomide given LFT abnormalities. On prednisone currently. Has stopped plaquenil. Followed at [facility].      Past Medical History:   Diagnosis Date    Allergic rhinitis     Asthma     Endometriosis     GERD (gastroesophageal reflux disease)     Osteopenia 2013    RA (rheumatoid arthritis) ([address]     Sjogren's disease ([facility] [address]        Past Surgical History:   Procedure Laterality Date    COLONOSCOPY  10/17/2016    Dr Sasha Rinaldi, 5 yr recall    COLONOSCOPY N/A 02/05/2021    Dr Sun Herd yr recall    ENDOSCOPY, COLON, DIAGNOSTIC      HYSTERECTOMY  1996    age 31    OVARY REMOVAL Bilateral 1996    age 31   [facility] ENDOSCOPY N/A 09/04/2020    Dr TEMITOPE Cornelius-w/kxp-[id number]-Lhftxrfoari, gastritis    UPPER GASTROINTESTINAL ENDOSCOPY N/A 02/05/2021 Dr TEMITOPE Cornelius-w/wji-[id number]-Cgzozekhywgd 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, gastritis-Benign    UPPER GASTROINTESTINAL ENDOSCOPY N/A 02/05/2021    Dr TEMITOPE Cornelius-w/rge-[id number]-Kzmrwnlqtfgx 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, gastritis-Benign       Family History   Problem Relation Age of Onset    Uterine Cancer Mother 22    Stomach Cancer Mother     Breast Cancer Maternal Grandmother 28    Ovarian Cancer Maternal Aunt 28    Cancer Maternal Uncle 27        melanoma    Cancer Maternal Uncle         liver       Social History     Socioeconomic History    Marital status:      Spouse name: Not on file    Number of children: Not on file    Years of education: Not on file    Highest education level: Not on file   Occupational History    Not on file   Tobacco Use    Smoking status: Never Smoker    Smokeless tobacco: Never Used   Vaping Use    Vaping Use: Never used   Substance and Sexual Activity    Alcohol use: No     Alcohol/week: 0.0 standard drinks    Drug use: No    Sexual activity: Yes     Partners: Male   Other Topics Concern    Not on file   Social History Narrative    Not on file     Social Determinants of Health     Financial Resource Strain:     Difficulty of Paying Living Expenses:    Food Insecurity:     Worried About Running Out of Food in the Last Year:     [address] in the Last Year:    Transportation Needs:     Lack of Transportation (Medical):      Lack of Transportation (Non-Medical):    Physical Activity:     Days of Exercise per Week:     Minutes of Exercise per Session:    Stress:     Feeling of Stress :    Social Connections:     Frequency of Communication with Friends and Family:     Frequency of Social Gatherings with Friends and Family:     Attends [employer] Services:     Active Member of Clubs or Organizations:     Attends Club or Organization Meetings:     Marital Status:    Intimate Partner Violence:     Fear of Current or Ex-Partner:     Emotionally Abused:  Physically Abused:     Sexually Abused:        Current Outpatient Medications   Medication Sig Dispense Refill    Adalimumab 40 MG/0.4ML PNKT Inject 40 mg into the skin every 14 days      pantoprazole (PROTONIX) 40 MG tablet Take 1 tablet by mouth 2 times daily Take 2 times daily for 3 months then decrease to once a day. 60 tablet 11    dicyclomine (BENTYL) 10 MG capsule Take 1 capsule by mouth 4 times daily 120 capsule 5    estradiol (ESTRACE) 2 MG tablet TAKE 1 TABLET BY MOUTH DAILY 30 tablet 11    fluticasone (FLONASE) 50 MCG/ACT nasal spray 2 sprays by Nasal route daily 1 Bottle 11    azelastine (ASTELIN) 0.1 % nasal spray 2 sprays by Nasal route 2 times daily 1 Bottle 11    PROCTOSOL HC 2.5 % rectal cream Apply BID as needed for hemorrhoids 1 Tube 6    naproxen (NAPROSYN) 500 MG tablet       Vitamin D (CHOLECALCIFEROL) 1000 UNITS CAPS capsule Take 1,000 Units by mouth daily      prednisoLONE 5 MG TABS Take by mouth (Patient not taking: Reported on 6/29/2021)       No current facility-administered medications for this visit.        Allergies   Allergen Reactions    Codeine Itching and Rash    Morphine      migraines    Sulfa Antibiotics          REVIEW OF SYSTEMS    Constitutional: []Fever []Sweat []Chills [] Recent Injury [x] Denies all unless marked  HEENT:[x]Headache  [] Head Injury/Hearing Loss  [] Sore Throat  [] Ear Ache/Dizziness  [x] Denies all unless marked  Spine:  [x] Neck pain  [] Back pain  [] Sciaticia  [x] Denies all unless marked  Cardiovascular:[]Heart Disease []Chest Pain [] Palpitations  [x] Denies all unless marked  Pulmonary: []Shortness of Breath []Cough   [x] Denies all unless marke  Gastrointestinal: []Nausea  []Vomiting  []Abdominal Pain  []Constipation  []Diarrhea  []Dark Bloody Stools  [x] Denies all unless marked  Psychiatric/Behavioral:[] Depression [] Anxiety [x] Denies all unless marked  Genitourinary:   [] Frequency  [] Urgency  [] Incontinence [] Pain with Urination  [x] Denies all unless marked  Extremities: []Pain  [x]Swelling  [x] Denies all unless marked  Musculoskeletal: [] Muscle Pain  [] Joint Pain  [] Arthritis [] Muscle Cramps [] Muscle Twitches  [x] Denies all unless marked  Sleep: [] Insomnia [] Snoring [] Restless Legs [] Sleep Apnea  [] Daytime Sleepiness  [x] Denies all unless marked  Skin:[] Rash [] Skin Discoloration [x] Denies all unless marked   Neurological: [x]Visual Disturbance/Memory Loss [x] Loss of Balance [] Slurred Speech/Weakness [] Seizures  [] Vertigo/Dizziness [x] Denies all unless marked     I have reviewed the above ROS with the patient and agree with the ROS as documented above. PHYSICAL EXAM    Constitutional -   /70   Pulse 64   Ht 5' 3\" (1.6 m)   Wt 132 lb (59.9 kg)   SpO2 97%   BMI 23.38 kg/m²   General appearance: No acute distress   EYES -   Conjunctiva normal  Pupillary exam as below, see CN exam in the neurologic exam  ENT-    No scars, masses, or lesions over external nose or ears  Hearing normal bilaterally to finger rub  Cardiovascular -   No clubbing, cyanosis, or edema   Pulmonary-   Good expansion, normal effort without use of accessory muscles  Musculoskeletal -   No significant wasting of muscles noted  Gait as below, see gait exam in the neurologic exam  Muscle strength, tone, stability as below. No bony deformities  Skin -   Warm, dry, and intact to inspection and palpation. No rash, erythema, or pallor  Psychiatric -   Mood, affect, and behavior appear normal    Memory as below see mental status examination in the neurologic exam    NEUROLOGICAL EXAM    Mental status   [x]Awake, alert, oriented   [x]Affect attention and concentration appear appropriate  []Recent and remote memory appears unremarkable  [x]Speech normal without dysarthria or aphasia, comprehension and repetition intact.    COMMENTS: Mild cognitive loss   Cranial Nerves [x]No VF deficit to confrontation  [x]PERRLA, EOMI, no nystagmus, conjugate eye movements, no ptosis  [x]Face symmetric  [x]Facial sensation intact  [x]Tongue midline no atrophy or fasciculations present  [x]Palate midline, hearing to finger rub normal bilaterally  [x]Shoulder shrug and SCM testing normal bilaterally  COMMENTS:   Motor   []5/5 strength x 4 extremities  [x]Normal bulk and tone  [x]No tremor present  [x]No rigidity or bradykinesia noted  COMMENTS: 4/5 globally    Sensory  []Sensation intact to light touch, pin prick, vibration, and proprioception BLE  []Sensation intact to light touch, pin prick, vibration, and proprioception BUE  COMMENTS: Mild decreased LT, Vib LLE>RLE, PP ok. Coordination [x]FTN normal bilaterally   []HTS normal bilaterally  []MENDY normal bilaterally. COMMENTS:   Reflexes  [x]Symmetric and non-pathological, slightly brisk  [x]Toes down going bilaterally  [x]No clonus present  COMMENTS:   Gait                  [x]Normal steady gait    []Ataxic    []Spastic     []Magnetic     []Shuffling  COMMENTS:       LABS RECORD AND IMAGING REVIEW (As below and per HPI)    Lab Results   Component Value Date    WBC 6.4 05/18/2021    HGB 13.4 05/18/2021    HCT 40.2 05/18/2021    MCV 92.2 05/18/2021     05/18/2021     Lab Results   Component Value Date     05/18/2021    K 3.6 05/18/2021    CL 98 05/18/2021    CO2 28 05/18/2021    BUN 13 05/18/2021    CREATININE 0.6 05/18/2021    GLUCOSE 85 05/18/2021    CALCIUM 9.6 05/18/2021    PROT 7.4 05/18/2021    PROT 6.8 05/18/2021    LABALBU 4.6 05/18/2021    LABALBU 4.27 05/18/2021    BILITOT 0.3 05/18/2021    ALKPHOS 73 05/18/2021    AST 56 (H) 05/18/2021     (H) 05/18/2021    LABGLOM >60 05/18/2021    GFRAA >59 05/18/2021     Primary records reviewed. MRI reviewed, minimal small vessel disease noted, nonspecific. Labs negative outside of mild LFT abnormalities. MRI L-spine with mild ddd, no overt spinal stenosis, no nf narrowing.      NCS/EMG normal x 4 ext     MRI C-spine 2018 with mild ddd    ASSESSMENT:    Dayana Young is a 45y.o. year old female here for numbness, weakness, headaches, memory loss. MRI with nonspecific white matter changes, no enhancing lesions and not overtly suggestive of demyelinating disease. History of underlying RA and spinal bifida occulta noted. MRI C/L-spine, NCS/EMG, labs largely negative. PLAN:  1. Hold off on LP for now as MS felt somewhat unlikely given MRI. May reconsider if worsens. Plan follow up MRI in 6 months to ensure no progression of white matter changes. 2.  Follow up with Rheumatology as directed. 3.  [facility] Neurontin for symptomatic treatment. 4.  Follow up with primary for mild LFT abnormalities. Rheumatology stopped Leflunomide.       Lukas Zamora DO  Board Certified Neurology

## 2021-07-22 RX ORDER — ERGOCALCIFEROL (VITAMIN D2) 1250 MCG
CAPSULE ORAL WEEKLY
COMMUNITY
Start: 2021-03-30

## 2021-07-23 ENCOUNTER — VIRTUAL VISIT (OUTPATIENT)
Dept: GASTROENTEROLOGY | Age: 51
End: 2021-07-23
Payer: OTHER GOVERNMENT

## 2021-07-23 DIAGNOSIS — K22.2 ESOPHAGEAL STRICTURE: Primary | ICD-10-CM

## 2021-07-23 DIAGNOSIS — R10.9 ABDOMINAL CRAMPING: ICD-10-CM

## 2021-07-23 DIAGNOSIS — K21.9 CHRONIC GERD: ICD-10-CM

## 2021-07-23 DIAGNOSIS — R13.10 DYSPHAGIA, UNSPECIFIED TYPE: ICD-10-CM

## 2021-07-23 PROCEDURE — 99213 OFFICE O/P EST LOW 20 MIN: CPT | Performed by: NURSE PRACTITIONER

## 2021-07-23 RX ORDER — PANTOPRAZOLE SODIUM 40 MG/1
40 TABLET, DELAYED RELEASE ORAL 2 TIMES DAILY
Qty: 60 TABLET | Refills: 11 | Status: SHIPPED | OUTPATIENT
Start: 2021-07-23 | End: 2022-01-28 | Stop reason: SDUPTHER

## 2021-07-23 ASSESSMENT — ENCOUNTER SYMPTOMS
CHOKING: 0
ABDOMINAL PAIN: 1
COUGH: 0
ANAL BLEEDING: 0
RECTAL PAIN: 0
ABDOMINAL DISTENTION: 0
NAUSEA: 0
TROUBLE SWALLOWING: 1
SHORTNESS OF BREATH: 0
BLOOD IN STOOL: 0
VOMITING: 0
CONSTIPATION: 0
DIARRHEA: 0

## 2021-07-23 NOTE — PATIENT INSTRUCTIONS
Patient Education        pantoprazole (oral/injection)  Pronunciation:  pan TOE pra zole  Brand:  Protonix  What is the most important information I should know about pantoprazole? Pantoprazole can cause kidney problems. Tell your doctor if you are urinating less than usual, or if you have blood in your urine. Diarrhea may be a sign of a new infection. Call your doctor if you have diarrhea that is watery or has blood in it. Pantoprazole may cause new or worsening symptoms of lupus. Tell your doctor if you have joint pain and a skin rash on your cheeks or arms that worsens in sunlight. You may be more likely to have a broken bone while taking this medicine long term or more than once per day. What is pantoprazole? Pantoprazole is used to treat erosive esophagitis (damage to the esophagus from stomach acid caused by gastroesophageal reflux disease, or GERD) in adults and children who are at least 11years old. Pantoprazole is usually given for up to 8 weeks at a time while your esophagus heals. Pantoprazole is also used to treat Zollinger-Holley syndrome and other conditions involving excess stomach acid. Pantoprazole is not for immediate relief of heartburn. Pantoprazole may also be used for purposes not listed in this medication guide. What should I discuss with my healthcare provider before using pantoprazole? Heartburn can mimic early symptoms of a heart attack. Get emergency medical help if you have chest pain that spreads to your jaw or shoulder and you feel anxious or light-headed. You should not use this medicine if:  · you also take medicine that contains rilpivirine ([facility] [place]);  · if you had breathing problems, kidney problems, or a severe allergic reaction after taking pantoprazole in the past;  · you are allergic to pantoprazole or similar medicines (lansoprazole, omeprazole, Nexium, Prevacid, Prilosec, and others).   Tell your doctor if you have ever had:  · low levels of magnesium in your blood;  · lupus; or  · osteoporosis or low bone mineral density. You may be more likely to have a broken bone in your hip, wrist, or spine while taking a proton pump inhibitor long-term or more than once per day. Talk with your doctor about ways to keep your bones healthy. Tell your doctor if you are pregnant or breastfeeding. Pantoprazole is not approved for use by anyone younger than 11years old. How should I use pantoprazole? Follow all directions on your prescription label and read all medication guides or instruction sheets. Use the medicine exactly as directed. Use the lowest dose for the shortest amount of time needed to treat your condition. Pantoprazole is taken by mouth (oral) or given as an infusion into a vein (injection). A healthcare provider may teach you how to properly use pantoprazole injection by yourself. Pantoprazole tablets are taken by mouth, with or without food. Pantoprazole oral granules should be taken 30 minutes before a meal.  Do not crush, chew, or break the tablet. Swallow it whole. The oral granules should be mixed with applesauce or apple juice and given either by mouth or through a nasogastric (NG) tube. Read and carefully follow any Instructions for Use provided with your medicine. Ask your doctor or pharmacist if you do not understand these instructions. Use this medicine for the full prescribed length of time, even if your symptoms quickly improve. Call your doctor if your symptoms do not improve or if they get worse while you are using this medicine. This medicine can affect the results of certain medical tests. Tell any doctor who treats you that you are using pantoprazole. Pantoprazole may also affect a drug-screening urine test and you may have false results. Tell the laboratory staff that you use this medicine. Store this medicine at room temperature away from moisture, heat, and light. What happens if I miss a dose?   Use the medicine as soon as you can, but skip the missed dose if it is almost time for your next dose. Do not use two doses at one time. What happens if I overdose? Seek emergency medical attention or call the Poison Help line at 1-531.438.7134. What should I avoid while using pantoprazole? This medicine can cause diarrhea, which may be a sign of a new infection. If you have diarrhea that is watery or bloody, call your doctor. Do not use anti-diarrhea medicine unless your doctor tells you to. What are the possible side effects of pantoprazole? Get emergency medical help if you have signs of an allergic reaction: hives; difficulty breathing; swelling of your face, lips, tongue, or throat. Call your doctor at once if you have:  · severe stomach pain, diarrhea that is watery or bloody;  · sudden pain or trouble moving your hip, wrist, or back;  · bruising or swelling where intravenous pantoprazole was injected;  · kidney problems -- fever, rash, nausea, loss of appetite, joint pain, urinating less than usual, blood in your urine, weight gain;  · low magnesium --dizziness, fast or irregular heart rate, tremors (shaking) or jerking muscle movements, feeling jittery, muscle cramps, muscle spasms in your hands and feet, cough or choking feeling; or  · new or worsening symptoms of lupus --joint pain, and a skin rash on your cheeks or arms that worsens in sunlight. Taking pantoprazole long-term may cause you to develop stomach growths called fundic gland polyps. Talk with your doctor about this risk. If you use pantoprazole for longer than 3 years, you could develop a vitamin B-12 deficiency. Talk to your doctor about how to manage this condition if you develop it. Common side effects may include:  · headache, dizziness;  · stomach pain, gas, nausea, vomiting, diarrhea;  · joint pain; or  · fever, rash, or cold symptoms (most common in children). This is not a complete list of side effects and others may occur.  Call your doctor for medical advice about side effects. You may report side effects to FDA at 5-067-KSB-4851. What other drugs will affect pantoprazole? Tell your doctor about all your other medicines, especially:  · digoxin;  · methotrexate; or  · a diuretic or \"water pill. \"  This list is not complete. Other drugs may affect pantoprazole, including prescription and over-the-counter medicines, vitamins, and herbal products. Not all possible drug interactions are listed here. Where can I get more information? Your pharmacist can provide more information about pantoprazole. Remember, keep this and all other medicines out of the reach of children, never share your medicines with others, and use this medication only for the indication prescribed. Every effort has been made to ensure that the information provided by [address] is accurate, up-to-date, and complete, but no guarantee is made to that effect. Drug information contained herein may be time sensitive. [facility] information has been compiled for use by healthcare practitioners and consumers in the [place] and therefore [facility] does not warrant that uses outside of the [place] are appropriate, unless specifically indicated otherwise. [facility]'s drug information does not endorse drugs, diagnose patients or recommend therapy. [facility] drug information is an informational resource designed to assist licensed healthcare practitioners in caring for their patients and/or to serve consumers viewing this service as a supplement to, and not a substitute for, the expertise, skill, knowledge and judgment of healthcare practitioners. The absence of a warning for a given drug or drug combination in no way should be construed to indicate that the drug or drug combination is safe, effective or appropriate for any given patient. [facility] does not assume any responsibility for any aspect of healthcare administered with the aid of information [facility] provides. The information contained herein is not intended to cover all possible uses, directions, precautions, warnings, drug interactions, allergic reactions, or adverse effects. If you have questions about the drugs you are taking, check with your doctor, nurse or pharmacist.  Copyright 0014-4351 [address]. Version: 21.01. Revision date: 1/4/2021. Care instructions adapted under license by [facility] ([facility]). If you have questions about a medical condition or this instruction, always ask your healthcare professional. [place] [address] any warranty or liability for your use of this information.

## 2021-07-23 NOTE — PROGRESS NOTES
2021    TELEHEALTH EVALUATION -- Audio/Visual (During SZQGI-06 public health emergency)    HPI:    Sasha Echevarria (:  1970) has requested an audio/video evaluation for the following concern(s):  Chief Complaint   Patient presents with    Follow-up       Pt seen today for follow up. Hx chronic GERD with esophageal stricture. PPI was increased to BID for 3 months at last visit. Today, she is back down to once a day Protonix and continues to have breakthrough symptoms of reflux. She c/o issues with swallowing, breads especially. While taking PPI BID, symptoms were better controlled. She also has hx RA and Sjögren's. She also uses Dicyclomine prn for abdominal cramping. Review of Systems   Constitutional: Negative for activity change, appetite change, fatigue, fever and unexpected weight change. HENT: Positive for trouble swallowing. Respiratory: Negative for cough, choking and shortness of breath. Cardiovascular: Negative for chest pain. Gastrointestinal: Positive for abdominal pain (cramping). Negative for abdominal distention, anal bleeding, blood in stool, constipation, diarrhea, nausea, rectal pain and vomiting. Reflux   Allergic/Immunologic: Negative for food allergies. All other systems reviewed and are negative. Prior to Visit Medications    Medication Sig Taking? Authorizing Provider   pantoprazole (PROTONIX) 40 MG tablet Take 1 tablet by mouth 2 times daily Take 2 times daily Yes Joanne Vance, APRN - NP   ergocalciferol (ERGOCALCIFEROL) 1.25 MG (24641 UT) capsule once a week  Historical Provider, MD   gabapentin (NEURONTIN) 300 MG capsule Take 1 capsule by mouth 2 times daily for 180 days. Patient taking differently: Take 300 mg by mouth daily.    Yves Adrian, DO   Adalimumab 40 MG/0.4ML PNKT Inject 40 mg into the skin every 14 days  Historical Provider, MD   dicyclomine (BENTYL) 10 MG capsule Take 1 capsule by mouth 4 times daily  Patient taking differently: Take 10 mg by mouth 3 times daily   Terrell Capellan MD   prednisoLONE 5 MG TABS Take by mouth as needed   Historical Provider, MD   estradiol (ESTRACE) 2 MG tablet TAKE 1 TABLET BY MOUTH DAILY  Shannan Fuller MD   fluticasone (FLONASE) 50 MCG/ACT nasal spray 2 sprays by Nasal route daily  Patient taking differently: 2 sprays by Nasal route as needed   Shannan Fuller MD   azelastine (ASTELIN) 0.1 % nasal spray 2 sprays by Nasal route 2 times daily  Patient taking differently: 2 sprays by Nasal route daily   Shannan Fuller MD   PROCTOSOL HC 2.5 % rectal cream Apply BID as needed for hemorrhoids  Shannan Fuller MD   naproxen (NAPROSYN) 500 MG tablet once a week   Historical Provider, MD       Social History     Tobacco Use    Smoking status: Never Smoker    Smokeless tobacco: Never Used   Vaping Use    Vaping Use: Never used   Substance Use Topics    Alcohol use: No     Alcohol/week: 0.0 standard drinks    Drug use: No        Allergies   Allergen Reactions    Codeine Itching and Rash    Morphine      migraines    Sulfa Antibiotics    ,   Past Medical History:   Diagnosis Date    Allergic rhinitis     Asthma     Endometriosis     GERD (gastroesophageal reflux disease)     Osteopenia 2013    RA [facility]umatoid arthritis) [facility]     Sjogren's disease ([facility]    ,   Past Surgical History:   Procedure Laterality Date    COLONOSCOPY  10/17/2016    Dr Judy Omalley, 5 yr recall    COLONOSCOPY N/A 02/05/2021    Dr Mcgowan Bitter yr recall    ENDOSCOPY, COLON, DIAGNOSTIC      HYSTERECTOMY  1996    age 31    OVARY REMOVAL Bilateral 1996    age 31   [facility] TUBAL [facility]IGATION      UPPER GASTROINTESTINAL ENDOSCOPY N/A 09/04/2020    Dr Damián Cornelius-[id number], gastritis    UPPER GASTROINTESTINAL ENDOSCOPY N/A 02/05/2021    Dr Damián Cornelius-[id number] 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, gastritis-Benign    UPPER GASTROINTESTINAL ENDOSCOPY N/A 02/05/2021    Dr Darwin Gemran 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, gastritis-Benign   ,   Social History     Tobacco Use    Smoking status: Never Smoker    Smokeless tobacco: Never Used   Vaping Use    Vaping Use: Never used   Substance Use Topics    Alcohol use: No     Alcohol/week: 0.0 standard drinks    Drug use: No   ,   Family History   Problem Relation Age of Onset    Uterine Cancer Mother 22    Stomach Cancer Mother     Liver Cancer Mother     Breast Cancer Maternal Grandmother 28    Ovarian Cancer Maternal Aunt 28    Cancer Maternal Uncle 27        melanoma    Cancer Maternal Uncle         liver    Liver Cancer Maternal Uncle     Colon Cancer Neg Hx     Esophageal Cancer Neg Hx     Liver Disease Neg Hx        PHYSICAL EXAMINATION:  [ INSTRUCTIONS:  \"[x]\" Indicates a positive item  \"[]\" Indicates a negative item  -- DELETE ALL ITEMS NOT EXAMINED]  Vital Signs: (As obtained by patient/caregiver or practitioner observation)    Blood pressure-  Heart rate-    Respiratory rate-    Temperature-  Pulse oximetry-     Constitutional: [x] Appears well-developed and well-nourished [x] No apparent distress      [] Abnormal-   Mental status  [x] Alert and awake  [x] Oriented to person/place/time [x]Able to follow commands      Eyes:  EOM    [x]  Normal  [] Abnormal-  Sclera  [x]  Normal  [] Abnormal -         Discharge [x]  None visible  [] Abnormal -    HENT:   [x] Normocephalic, atraumatic.   [] Abnormal   [x] Mouth/Throat: Mucous membranes are moist.     External Ears [x] Normal  [] Abnormal-     Neck: [x] No visualized mass     Pulmonary/Chest: [x] Respiratory effort normal.  [x] No visualized signs of difficulty breathing or respiratory distress        [] Abnormal-      Musculoskeletal:   [x] Normal gait with no signs of ataxia         [x] Normal range of motion of neck        [] Abnormal-       Neurological:        [x] No Facial Asymmetry (Cranial nerve 7 motor function) (limited exam to video visit)          [x] No gaze palsy [] Abnormal-         Skin:        [x] No significant exanthematous lesions or discoloration noted on facial skin         [] Abnormal-            Psychiatric:       [x] Normal Affect [x] No Hallucinations        [] Abnormal-     Other pertinent observable physical exam findings-     ASSESSMENT/PLAN:  1. Esophageal stricture  2. Chronic GERD  3. Dysphagia, unspecified type  4. Abdominal cramping    - Continue Protonix at 40 mg BID  - Will monitor GFR frequently, she states she has labs every 3 months with Rheumatology  - Follow up in 6 months or sooner if needed  - Will consider decreasing  PPI to once daily at next OV  - Call with any questions or concerns  - Repeat EGD with dilation prn       Return in about 6 months (around 1/23/2022). Iban Alcantara, was evaluated through a synchronous (real-time) audio-video encounter. The patient (or guardian if applicable) is aware that this is a billable service. Verbal consent to proceed has been obtained within the past 12 months. The visit was conducted pursuant to the emergency declaration under the [address] [place] [address], [address] [place] [address] authority and the [employer] Act. Patient identification was verified, and a caregiver was present when appropriate. The patient was located in a state where the provider was credentialed to provide care. Total time spent on this encounter: Not billed by time    --TANA Roque NP on 7/23/2021 at 11:26 AM    An electronic signature was used to authenticate this note.

## 2021-08-30 ENCOUNTER — OFFICE VISIT (OUTPATIENT)
Dept: ENT CLINIC | Age: 51
End: 2021-08-30
Payer: OTHER GOVERNMENT

## 2021-08-30 VITALS
SYSTOLIC BLOOD PRESSURE: 112 MMHG | DIASTOLIC BLOOD PRESSURE: 64 MMHG | HEIGHT: 63 IN | WEIGHT: 131 LBS | BODY MASS INDEX: 23.21 KG/M2

## 2021-08-30 DIAGNOSIS — L98.9 ENCOUNTER FOR REMOVAL OF SKIN LESION: Primary | ICD-10-CM

## 2021-08-30 DIAGNOSIS — L98.9 BENIGN SKIN LESION OF FOREHEAD: ICD-10-CM

## 2021-08-30 PROCEDURE — 99202 OFFICE O/P NEW SF 15 MIN: CPT | Performed by: PHYSICIAN ASSISTANT

## 2021-08-30 PROCEDURE — 11641 EXC F/E/E/N/L MAL+MRG 0.6-1: CPT | Performed by: PHYSICIAN ASSISTANT

## 2021-08-30 RX ORDER — NABUMETONE 500 MG/1
TABLET, FILM COATED ORAL PRN
COMMUNITY
Start: 2020-12-03 | End: 2022-05-02 | Stop reason: ALTCHOICE

## 2021-08-30 RX ORDER — HYDROCORTISONE 25 MG/G
CREAM TOPICAL PRN
COMMUNITY
Start: 2021-08-24

## 2021-08-30 RX ORDER — HYDROCODONE BITARTRATE AND ACETAMINOPHEN 5; 325 MG/1; MG/1
1 TABLET ORAL EVERY 6 HOURS PRN
Qty: 12 TABLET | Refills: 0 | Status: SHIPPED | OUTPATIENT
Start: 2021-08-30 | End: 2021-09-02

## 2021-08-30 RX ORDER — LIFITEGRAST 50 MG/ML
SOLUTION/ DROPS OPHTHALMIC DAILY
COMMUNITY
Start: 2020-08-19

## 2021-08-30 RX ORDER — DICLOFENAC SODIUM AND MISOPROSTOL 75; 200 MG/1; UG/1
TABLET, DELAYED RELEASE ORAL PRN
COMMUNITY
End: 2022-05-02 | Stop reason: ALTCHOICE

## 2021-08-30 RX ORDER — CETIRIZINE HYDROCHLORIDE 10 MG/1
TABLET ORAL PRN
COMMUNITY
Start: 2021-06-30

## 2021-08-30 NOTE — PROGRESS NOTES
[facility] OTOLARYNGOLOGY/ENT      PREOP NOTE   Peace Donato is a pleasant 58-year-old  female that was referred to us due to an ulcerative lesion to the forehead. Peace Donato reports that this was first noted about 2 months ago. She reports that the lesion will almost heal and then ulcerates and bleeds and opens up. She admits to history of sun exposure and is requesting evaluation. The patient was noted with a 1 cm x 0.8 cm ulcerative lesion to the right side of the anterior forehead. This seems suspicious for a possible basal cell versus squamous cell. The risks, benefits, and options were discussed with the patient. She was agreeable to proceed with excisional surgery with pathological analysis. PROCEDURE NOTE:  After informed consent was obtained, the lesion was anesthetized with 1% lidocaine with epinephrine. The lesion was prepped and draped in a sterile fashion. An elliptical incision was made around the lesion. A full-thickness specimen was obtained down to the fascia with the specimen placed in formalin for anticipated pathological analysis. Pressure was applied to the open wound. The open wound was then approximated with interrupted 3-0 Monocryl sutures followed by a running 4-0 Monocryl subcuticular. Dermabond was applied to the skin as a dressing. The patient tolerated the procedure nicely with no complications. Wound care instructions were discussed with the patient. Almon Kayser was called into her pharmacy for postoperative pain control. She is to follow-up in clinic in 2 weeks for reevaluation. I will call her the pathological results once this has been completed. She is reminded to call if she has any questions or problems.       Electronically signed by Mariana Velasco PA-C on 8/30/21 at 4:47 PM CDT

## 2021-08-31 RX ORDER — KETOROLAC TROMETHAMINE 10 MG/1
10 TABLET, FILM COATED ORAL
Qty: 21 TABLET | Refills: 0 | Status: SHIPPED | OUTPATIENT
Start: 2021-08-31 | End: 2021-12-27

## 2021-09-02 RX ORDER — CEPHALEXIN 500 MG/1
500 CAPSULE ORAL 3 TIMES DAILY
Qty: 30 CAPSULE | Refills: 0 | Status: SHIPPED | OUTPATIENT
Start: 2021-09-02 | End: 2021-09-12

## 2021-11-01 ENCOUNTER — OFFICE VISIT (OUTPATIENT)
Dept: NEUROSURGERY | Age: 51
End: 2021-11-01
Payer: OTHER GOVERNMENT

## 2021-11-01 VITALS
DIASTOLIC BLOOD PRESSURE: 74 MMHG | HEIGHT: 63 IN | HEART RATE: 74 BPM | WEIGHT: 131 LBS | OXYGEN SATURATION: 97 % | BODY MASS INDEX: 23.21 KG/M2 | SYSTOLIC BLOOD PRESSURE: 128 MMHG

## 2021-11-01 DIAGNOSIS — R51.9 HEADACHE, UNSPECIFIED HEADACHE TYPE: ICD-10-CM

## 2021-11-01 DIAGNOSIS — R20.0 NUMBNESS: ICD-10-CM

## 2021-11-01 DIAGNOSIS — M25.562 CHRONIC PAIN OF LEFT KNEE: ICD-10-CM

## 2021-11-01 DIAGNOSIS — R53.1 WEAKNESS: ICD-10-CM

## 2021-11-01 DIAGNOSIS — R90.89 ABNORMAL BRAIN MRI: Primary | ICD-10-CM

## 2021-11-01 DIAGNOSIS — G89.29 CHRONIC PAIN OF LEFT KNEE: ICD-10-CM

## 2021-11-01 DIAGNOSIS — M54.50 CHRONIC BILATERAL LOW BACK PAIN WITHOUT SCIATICA: ICD-10-CM

## 2021-11-01 DIAGNOSIS — G89.29 CHRONIC BILATERAL LOW BACK PAIN WITHOUT SCIATICA: ICD-10-CM

## 2021-11-01 DIAGNOSIS — R41.3 MEMORY LOSS: ICD-10-CM

## 2021-11-01 PROCEDURE — 99214 OFFICE O/P EST MOD 30 MIN: CPT | Performed by: PSYCHIATRY & NEUROLOGY

## 2021-11-01 NOTE — PROGRESS NOTES
[facility] Neurology Office Note      Patient:   Soha Burroughs  MR#:    [id number]  Account Number:                         YOB: 1970  Date of Evaluation:  11/1/2021  Time of Note:                          1:59 PM  Primary/Referring Physician:  Jeremiah Paige DO   Consulting Physician:  Frankey Slot, DO    FOLLOW UP VISIT    Chief Complaint   Patient presents with    Numbness     4 Month follow up     Difficulty Walking     Left leg pain having difficulty walking        HISTORY OF PRESENT ILLNESS    Soha Burroughs is a 45y.o. year old female here for numbness, headaches, memory loss, and weakness. Doing about the same since last seen. Symptoms are waxing and waning. Symptoms started a year or so ago and have progressively worsened. She notes memory loss, primarily short term. Also notes widespread numbness and tingling and also noting worsening weakness in the arms and legs. Some gait difficulty noted as well. She does note some neck pain which waxes and wane. She notes a history of spina bifida occulta. MRI brain with mild nonspecific white matter changes otherwise negative. Denies anxiety or depression. Does have an underlying history of RA and possible Sjogren's. Now off Leflunomide given LFT abnormalities. On prednisone currently. Has stopped plaquenil. Followed at [address] [place] [address]. Workup as below. On neurontin which has helped.      Past Medical History:   Diagnosis Date    Allergic rhinitis     Asthma     Endometriosis     GERD (gastroesophageal reflux disease)     Osteopenia 2013    [facility] [address]     Sjogren's disease ([facility] [address]        Past Surgical History:   Procedure Laterality Date    COLONOSCOPY  10/17/2016    Dr Ronald Jara, 5 yr recall    COLONOSCOPY N/A 02/05/2021    Dr Hinse All yr recall    ENDOSCOPY, COLON, DIAGNOSTIC      HYSTERECTOMY  1996    age 31    OVARY REMOVAL Bilateral 1996    age 31   [address] [place] [address]  Social Gatherings with Friends and Family:     Attends Yarsani Services:     Active Member of Clubs or Organizations:     Attends Club or Organization Meetings:     Marital Status:    Intimate Partner Violence:     Fear of Current or Ex-Partner:     Emotionally Abused:     Physically Abused:     Sexually Abused:        Current Outpatient Medications   Medication Sig Dispense Refill    ketorolac (TORADOL) 10 MG tablet Take 1 tablet by mouth 3 times daily (with meals) 21 tablet 0    cetirizine (ZYRTEC) 10 MG tablet       influenza quadrivalent split vaccine (FLUZONE QUADRIVALENT) 0.5 ML injection       diclofenac-miSOPROStol (ARTHROTEC 75) 75-0.2 MG per tablet 2 times a day (after breakfast and dinner).  nabumetone (RELAFEN) 500 MG tablet       hydrocortisone 2.5 % cream       hydrocortisone (ANUSOL-HC) 2.5 % CREA rectal cream       Lifitegrast (XIIDRA) 5 % SOLN       pantoprazole (PROTONIX) 40 MG tablet Take 1 tablet by mouth 2 times daily Take 2 times daily 60 tablet 11    ergocalciferol (ERGOCALCIFEROL) 1.25 MG (00894 UT) capsule once a week      gabapentin (NEURONTIN) 300 MG capsule Take 1 capsule by mouth 2 times daily for 180 days.  (Patient taking differently: Take 300 mg by mouth daily. ) 60 capsule 5    Adalimumab 40 MG/0.4ML PNKT Inject 40 mg into the skin every 14 days      dicyclomine (BENTYL) 10 MG capsule Take 1 capsule by mouth 4 times daily (Patient taking differently: Take 10 mg by mouth 3 times daily ) 120 capsule 5    prednisoLONE 5 MG TABS Take by mouth as needed       estradiol (ESTRACE) 2 MG tablet TAKE 1 TABLET BY MOUTH DAILY 30 tablet 11    fluticasone (FLONASE) 50 MCG/ACT nasal spray 2 sprays by Nasal route daily (Patient taking differently: 2 sprays by Nasal route as needed ) 1 Bottle 11    azelastine (ASTELIN) 0.1 % nasal spray 2 sprays by Nasal route 2 times daily (Patient taking differently: 2 sprays by Nasal route daily ) 1 Bottle 11    PROCTOSOL HC 2.5 % rectal cream Apply BID as needed for hemorrhoids 1 Tube 6    naproxen (NAPROSYN) 500 MG tablet once a week        No current facility-administered medications for this visit. Allergies   Allergen Reactions    Codeine Itching and Rash    Morphine      migraines    Sulfa Antibiotics          REVIEW OF SYSTEMS    Constitutional: []Fever []Sweat []Chills [] Recent Injury [x] Denies all unless marked  HEENT:[]Headache  [] Head Injury/Hearing Loss  [] Sore Throat  [] Ear Ache/Dizziness  [x] Denies all unless marked  Spine:  [] Neck pain  [] Back pain  [] Sciaticia  [x] Denies all unless marked  Cardiovascular:[]Heart Disease []Chest Pain [] Palpitations  [x] Denies all unless marked  Pulmonary: []Shortness of Breath []Cough   [x] Denies all unless marke  Gastrointestinal: []Nausea  []Vomiting  []Abdominal Pain  []Constipation  []Diarrhea  []Dark Bloody Stools  [x] Denies all unless marked  Psychiatric/Behavioral:[] Depression [] Anxiety [x] Denies all unless marked  Genitourinary:   [] Frequency  [] Urgency  [] Incontinence [] Pain with Urination  [x] Denies all unless marked  Extremities: [x]Pain  []Swelling  [x] Denies all unless marked  Musculoskeletal: [x] Muscle Pain  [] Joint Pain  [] Arthritis [] Muscle Cramps [] Muscle Twitches  [x] Denies all unless marked  Sleep: [] Insomnia [] Snoring [] Restless Legs [] Sleep Apnea  [] Daytime Sleepiness  [x] Denies all unless marked  Skin:[] Rash [] Skin Discoloration [x] Denies all unless marked   Neurological: []Visual Disturbance/Memory Loss [] Loss of Balance [] Slurred Speech/Weakness [] Seizures  [] Vertigo/Dizziness [x] Denies all unless marked     I have reviewed the above ROS with the patient and agree with the ROS as documented above.          PHYSICAL EXAM    Constitutional -   /74   Pulse 74   Ht 5' 3\" (1.6 m)   Wt 131 lb (59.4 kg)   SpO2 97%   BMI 23.21 kg/m²   General appearance: No acute distress   EYES -   Conjunctiva normal  Pupillary exam as below, see CN exam in the neurologic exam  ENT-    No scars, masses, or lesions over external nose or ears  Hearing normal bilaterally to finger rub  Cardiovascular -   No clubbing, cyanosis, or edema   Pulmonary-   Good expansion, normal effort without use of accessory muscles  Musculoskeletal -   No significant wasting of muscles noted  Gait as below, see gait exam in the neurologic exam  Muscle strength, tone, stability as below. No bony deformities  Skin -   Warm, dry, and intact to inspection and palpation. No rash, erythema, or pallor  Psychiatric -   Mood, affect, and behavior appear normal    Memory as below see mental status examination in the neurologic exam    NEUROLOGICAL EXAM    Mental status   [x]Awake, alert, oriented   [x]Affect attention and concentration appear appropriate  []Recent and remote memory appears unremarkable  [x]Speech normal without dysarthria or aphasia, comprehension and repetition intact. COMMENTS: Mild cognitive loss   Cranial Nerves [x]No VF deficit to confrontation  [x]PERRLA, EOMI, no nystagmus, conjugate eye movements, no ptosis  [x]Face symmetric  [x]Facial sensation intact  [x]Tongue midline no atrophy or fasciculations present  [x]Palate midline, hearing to finger rub normal bilaterally  [x]Shoulder shrug and SCM testing normal bilaterally  COMMENTS:   Motor   []5/5 strength x 4 extremities  [x]Normal bulk and tone  [x]No tremor present  [x]No rigidity or bradykinesia noted  COMMENTS: 4/5 globally    Sensory  []Sensation intact to light touch, pin prick, vibration, and proprioception BLE  []Sensation intact to light touch, pin prick, vibration, and proprioception BUE  COMMENTS: Mild decreased LT, Vib LLE>RLE, PP ok. Coordination [x]FTN normal bilaterally   []HTS normal bilaterally  []MENDY normal bilaterally.    COMMENTS:   Reflexes  [x]Symmetric and non-pathological, slightly brisk  [x]Toes down going bilaterally  [x]No clonus present  COMMENTS:   Gait [x]Normal steady gait    []Ataxic    []Spastic     []Magnetic     []Shuffling  COMMENTS:       LABS RECORD AND IMAGING REVIEW (As below and per HPI)    Lab Results   Component Value Date    WBC 6.4 05/18/2021    HGB 13.4 05/18/2021    HCT 40.2 05/18/2021    MCV 92.2 05/18/2021     05/18/2021     Lab Results   Component Value Date     05/18/2021    K 3.6 05/18/2021    CL 98 05/18/2021    CO2 28 05/18/2021    BUN 13 05/18/2021    CREATININE 0.6 05/18/2021    GLUCOSE 85 05/18/2021    CALCIUM 9.6 05/18/2021    PROT 7.4 05/18/2021    PROT 6.8 05/18/2021    LABALBU 4.6 05/18/2021    LABALBU 4.27 05/18/2021    BILITOT 0.3 05/18/2021    ALKPHOS 73 05/18/2021    AST 56 (H) 05/18/2021     (H) 05/18/2021    LABGLOM >60 05/18/2021    GFRAA >59 05/18/2021     Primary records reviewed. MRI reviewed, minimal small vessel disease noted, nonspecific. Labs negative outside of mild LFT abnormalities. MRI L-spine with mild ddd, no overt spinal stenosis, no nf narrowing. NCS/EMG normal x 4 ext     MRI C-spine 2018 with mild ddd    ASSESSMENT:    Lion Snowden is a 45y.o. year old female here for numbness, weakness, headaches, memory loss. MRI with nonspecific white matter changes, no enhancing lesions and not overtly suggestive of demyelinating disease. History of underlying RA and spinal bifida occulta noted. MRI C/L-spine, NCS/EMG, labs largely negative. Noting more left knee pain. PLAN:  1. Hold off on LP for now as MS felt somewhat unlikely given MRI. May reconsider if worsens. Plan follow up MRI to ensure no progression of white matter changes. 2.  Follow up with Rheumatology as directed. 3.  Continue Neurontin for symptomatic treatment. 4.  Follow up with primary for mild LFT abnormalities. Rheumatology stopped Leflunomide. 5.  Ortho referral for left knee pain.        Kylee Oliva DO  Board Certified Neurology

## 2021-11-03 ENCOUNTER — HOSPITAL ENCOUNTER (OUTPATIENT)
Dept: MRI IMAGING | Age: 51
Discharge: HOME | End: 2021-11-03
Payer: OTHER GOVERNMENT

## 2021-11-03 DIAGNOSIS — R90.89 ABNORMAL BRAIN MRI: ICD-10-CM

## 2021-11-03 PROCEDURE — 70553 MRI BRAIN STEM W/O & W/DYE: CPT

## 2021-11-03 PROCEDURE — 6360000004 HC RX CONTRAST MEDICATION: Performed by: PSYCHIATRY & NEUROLOGY

## 2021-11-03 PROCEDURE — A9577 INJ MULTIHANCE: HCPCS | Performed by: PSYCHIATRY & NEUROLOGY

## 2021-11-03 RX ADMIN — GADOBENATE DIMEGLUMINE 12 ML: 529 INJECTION, SOLUTION INTRAVENOUS at 16:40

## 2021-11-11 ENCOUNTER — TELEPHONE (OUTPATIENT)
Dept: SURGERY | Age: 51
End: 2021-11-11

## 2021-12-03 ENCOUNTER — HOSPITAL ENCOUNTER (OUTPATIENT)
Dept: PHYSICAL THERAPY | Age: 51
Setting detail: THERAPIES SERIES
Discharge: HOME | End: 2021-12-03
Payer: OTHER GOVERNMENT

## 2021-12-03 PROCEDURE — 97110 THERAPEUTIC EXERCISES: CPT

## 2021-12-03 PROCEDURE — 97162 PT EVAL MOD COMPLEX 30 MIN: CPT

## 2021-12-03 ASSESSMENT — PAIN DESCRIPTION - LOCATION: LOCATION: KNEE

## 2021-12-03 ASSESSMENT — PAIN DESCRIPTION - DESCRIPTORS: DESCRIPTORS: SORE

## 2021-12-03 ASSESSMENT — PAIN SCALES - GENERAL: PAINLEVEL_OUTOF10: 4

## 2021-12-03 ASSESSMENT — PAIN DESCRIPTION - ORIENTATION: ORIENTATION: LEFT

## 2021-12-03 ASSESSMENT — PAIN DESCRIPTION - FREQUENCY: FREQUENCY: INTERMITTENT

## 2021-12-03 ASSESSMENT — PAIN DESCRIPTION - PAIN TYPE: TYPE: ACUTE PAIN

## 2021-12-03 NOTE — PROGRESS NOTES
Physical Therapy  Initial Assessment  Date: 12/3/2021  Patient Name: Jose Kay  MRN: [id number]  : 1970          Restrictions       Subjective   General  Patient assessed for rehabilitation services?: Yes  Additional Pertinent Hx: 45year old female referred to PT with diagnosis of acute left knee pain. Response To Previous Treatment: Not applicable  Referring Practitioner: Jaquelyn Seip, MD  Referral Date : 21  Diagnosis: acute pain of left knee (M25.562)  Follows Commands: Within Functional Limits  PT Visit Information  PT Insurance Information:  [employer]  Total # of Visits Approved: 2  Total # of Visits to Date: 1  Progress Note Due Date: 22  Subjective  Subjective: Patient reports having left knee pain about a month now, noticed going up 5-6 stairs at work when she experienced knee pain (does not recall any specific trauma to knee). She states that bending is difficult, sudden pivots, and when first standing. Straightening out left knee is difficult when lying down, bending knee is also difficult. Pain is posterolaterally located, sitting she keeps her left knee extending.   Pain Screening  Patient Currently in Pain: Yes  Pain Assessment  Pain Assessment: 0-10  Pain Level: 4  Pain Type: Acute pain  Pain Location: Knee  Pain Orientation: Left  Pain Descriptors: Sore  Pain Frequency: Intermittent  Vital Signs  Patient Currently in Pain: Yes    Vision/Hearing  Vision  Vision: Within Functional Limits  Hearing  Hearing: Within functional limits    Orientation  Orientation  Overall Orientation Status: Within Normal Limits    Social/Functional History  Social/Functional History  Lives With: Spouse  Type of Home: House  Home Layout: One level  ADL Assistance: Independent  Homemaking Assistance: Independent  Ambulation Assistance: Independent  Transfer Assistance: Independent  Active : Yes  Occupation: Full time employment  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 Medial joint line is tender to palpation left knee. Observation: Patient sits with left knee extended in sitting, comes to standing with greater weight through right leg, stands with slight shift away from left side, knees are extended bilaterally. PROM RLE (degrees)  RLE General PROM: knee flexion: 145 deg           ,knee extension: 0 degrees  PROM LLE (degrees)  LLE General PROM: knee flexion: 108 degrees knee flexion, knee extension: 0 degrees    Strength RLE  R Knee Flexion: 4-/5  R Knee Extension: 5/5  Strength LLE  L Knee Flexion: 3+/5; 4-/5  L Knee Extension: 4+/5     Additional Measures  Special Tests: Patient does not show appreciable increased varus/valgus stresses to left knee, no pain with medial and lateral torsion of left tibia. Patient has increased pain with left knee flexion, increased pain with sustained weight bearing in standing and when walking (exhibits antalgic gait)  Other: 56% impairment on the Knee Outcome Survey. Sensation  Overall Sensation Status: WNL  Bed mobility  Rolling to Left: Independent  Rolling to Right: Independent  Supine to Sit: Independent  Sit to Supine: Independent  Transfers  Sit to Stand: Modified independent  Stand to sit: Modified independent  Bed to Chair: Independent  Stand Pivot Transfers: Independent       Ambulation  Ambulation?: Yes  Ambulation 1  Device: No Device  Assistance: Independent  Quality of Gait: Limping left LE (decreased stance time), decreased arm swing. Assessment   Conditions Requiring Skilled Therapeutic Intervention  Body structures, Functions, Activity limitations: Decreased functional mobility ; Decreased ADL status; Decreased ROM; Decreased strength; Decreased high-level IADLs;  Increased pain; Decreased posture  Assessment: Patient problem list: 1) left knee pain, unknown mechanism of injury, 2) decreased left knee flexion, 3) swelling of left knee, 4) decreased tolerance for standing and walking, 5) need for instruction in HEP. Prognosis: Good; Fair  Decision Making: Medium Complexity  REQUIRES PT FOLLOW UP: Yes         Plan   Plan  Times per week: 2 visits total  Plan Comment: Discharge with HEP after instruction at second visit.     Goals  Short term goals  Time Frame for Short term goals: 1-2 visits  Short term goal 1: Patient to be instructed on left knee HEP (2nd visit)       Therapy Time   Individual Concurrent Group Co-treatment   Time In 1254         Time Out 1337         Minutes 43         Timed Code Treatment Minutes: 43 Minutes  Electronically signed by Fausto De Los Santos PT on 12/3/2021 at 4:44 PM

## 2021-12-09 ENCOUNTER — HOSPITAL ENCOUNTER (OUTPATIENT)
Dept: PHYSICAL THERAPY | Age: 51
Setting detail: THERAPIES SERIES
Discharge: HOME | End: 2021-12-09
Payer: OTHER GOVERNMENT

## 2021-12-09 ASSESSMENT — PAIN DESCRIPTION - ORIENTATION: ORIENTATION: LEFT

## 2021-12-09 ASSESSMENT — PAIN SCALES - GENERAL: PAINLEVEL_OUTOF10: 2

## 2021-12-09 NOTE — PROGRESS NOTES
Physical Therapy  Daily Treatment Note/Discharge Note  Date: 2021  Patient Name: Yuri Rudolph  MRN: [id number]     :   1970    Subjective:   General  Additional Pertinent Hx: 45year old female referred to PT with diagnosis of acute left knee pain. Response To Previous Treatment: Not applicable  Referring Practitioner: Gilda Frias MD  PT Visit Information  PT Insurance Information: [facility]  Total # of Visits Approved: 2  Subjective  Subjective: Patient reports no new changes since she was last seen for PT. Pain Screening  Patient Currently in Pain: Yes  Pain Assessment  Pain Assessment: 0-10  Pain Level: 2  Pain Orientation: Left  Vital Signs  Patient Currently in Pain: Yes       Treatment Activities:                                    Exercises  Exercise 1: bilateral quad sets--10 reps  Exercise 2: left SLR --10 reps  Exercise 3: supine hamstring curls--10 reps  Exercise 4: heel dig bridging--not today  Exercise 5: SAQ's--10 reps  Exercise 6: left leg clamshell (lying on right side)--10 reps  Exercise 7: left heel slides--10 reps  Exercise 8: LAQ's--10  Exercise 9: heel raises--15 reps  Exercise 10: standing knee flexion--10 reps  Exercise 11: shallow partial squats--10 reps  Exercise 12: wall sits (shallow)--2 x 15 secs  Exercise 13: marching in place--1'  Exercise 14: left single leg stand--15\" x 2  Exercise 17: bicycle                               Assessment:   Conditions Requiring Skilled Therapeutic Intervention  Body structures, Functions, Activity limitations: Decreased functional mobility ; Decreased ADL status; Decreased ROM; Decreased strength; Decreased high-level IADLs; Increased pain; Decreased posture  Assessment: Patient returned for PT today for HEP instruction. Ran through her exercise routine with patient reporting increased pain with wall sit. Otherwise, she appeared to tolerate her exercises fairly well.  Will discharge from PT today with patient to continue HEP independently. Prognosis: Good; Fair  Decision Making: Medium Complexity  REQUIRES PT FOLLOW UP: No  Discharge Recommendations: Home independently    Goals:  Short term goals  Time Frame for Short term goals: 1-2 visits  Short term goal 1: Patient to be instructed on left knee HEP (2nd visit)--HEP issued and reviewed 12/9/21  Plan:    Plan  Times per week: 2 visits total  Current Treatment Recommendations: Strengthening, ROM, Home Exercise Program  Plan Comment: Discharge today.   Timed Code Treatment Minutes: 31 Minutes     Therapy Time   Individual Concurrent Group Co-treatment   Time In 1612         Time Out 1643         Minutes 31         Timed Code Treatment Minutes: 31 Minutes  Electronically signed by Dereck Dumas PT on 12/9/2021 at 4:56 PM

## 2021-12-27 ENCOUNTER — OFFICE VISIT (OUTPATIENT)
Dept: SURGERY | Age: 51
End: 2021-12-27
Payer: OTHER GOVERNMENT

## 2021-12-27 VITALS
BODY MASS INDEX: 23.62 KG/M2 | SYSTOLIC BLOOD PRESSURE: 96 MMHG | WEIGHT: 133.3 LBS | HEIGHT: 63 IN | DIASTOLIC BLOOD PRESSURE: 62 MMHG | TEMPERATURE: 97.7 F

## 2021-12-27 DIAGNOSIS — R20.0 NUMBNESS: ICD-10-CM

## 2021-12-27 DIAGNOSIS — M79.2 NEUROGENIC PAIN: ICD-10-CM

## 2021-12-27 PROCEDURE — 99212 OFFICE O/P EST SF 10 MIN: CPT | Performed by: SURGERY

## 2021-12-29 NOTE — PROGRESS NOTES
Julianne Mae is a 60-year-old lady who 4 months ago underwent uncomplicated excision of a small basal cell carcinoma from her forehead. This was done by Renetta Ames in the ENT office. Her margins were clear and she initially did quite well. She now is having persistent neurogenic pain in the center of her incision that is really bothering her. We discussed this and feel that injection of Marcaine and Decadron could be very helpful for her    EXAM: She has a well-healed incision in her forehead there is point tenderness and the center of the incision that does radiate. PROCEDURE: We prepped the skin with alcohol and injected 1 cc of Marcaine and 4 mg of Decadron. The pain improved immediately. She tolerated the procedure well.

## 2022-01-28 ENCOUNTER — VIRTUAL VISIT (OUTPATIENT)
Dept: GASTROENTEROLOGY | Age: 52
End: 2022-01-28
Payer: OTHER GOVERNMENT

## 2022-01-28 DIAGNOSIS — K22.2 ESOPHAGEAL STRICTURE: Primary | ICD-10-CM

## 2022-01-28 DIAGNOSIS — K21.9 CHRONIC GERD: ICD-10-CM

## 2022-01-28 DIAGNOSIS — R13.10 DYSPHAGIA, UNSPECIFIED TYPE: ICD-10-CM

## 2022-01-28 PROCEDURE — 99214 OFFICE O/P EST MOD 30 MIN: CPT | Performed by: NURSE PRACTITIONER

## 2022-01-28 RX ORDER — PANTOPRAZOLE SODIUM 40 MG/1
40 TABLET, DELAYED RELEASE ORAL 2 TIMES DAILY
Qty: 60 TABLET | Refills: 3 | Status: SHIPPED | OUTPATIENT
Start: 2022-01-28 | End: 2022-08-11 | Stop reason: SDUPTHER

## 2022-01-28 ASSESSMENT — ENCOUNTER SYMPTOMS
NAUSEA: 0
BLOOD IN STOOL: 0
DIARRHEA: 0
ABDOMINAL PAIN: 0
TROUBLE SWALLOWING: 1
CHOKING: 1
COUGH: 0
RECTAL PAIN: 0
ABDOMINAL DISTENTION: 0
SHORTNESS OF BREATH: 0
VOMITING: 0
ANAL BLEEDING: 0
CONSTIPATION: 0

## 2022-01-28 NOTE — PROGRESS NOTES
2022    TELEHEALTH EVALUATION -- Audio/Visual (During NORXE-16 public health emergency)    HPI:    Joshua Guzman (:  1970) has requested an audio/video evaluation for the following concern(s):  Chief Complaint   Patient presents with    Follow-up       Pt seen today for follow up. Hx chronic GERD. She is currently taking Protonix 40 mg once daily. Today, she c/o more choking episodes. She feels this is worse with liquids. She states she will take a drink of something and it will just stay at base of esophagus. This makes her feel like she can't catch her breath. She states she is trying to avoid repeat EGD with dilation due to costs. Last EGD 2021 - [id number] 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, gastritis-Benign  Last Colonoscopy 2021 - normal, 10 year recall   Denies any family hx colon cancer or colon polyps    Review of Systems   Constitutional: Negative for activity change, appetite change, fatigue, fever and unexpected weight change. HENT: Positive for trouble swallowing. Respiratory: Positive for choking. Negative for cough and shortness of breath. Cardiovascular: Negative for chest pain. Gastrointestinal: Negative for abdominal distention, abdominal pain, anal bleeding, blood in stool, constipation, diarrhea, nausea, rectal pain and vomiting. Reflux   Allergic/Immunologic: Negative for food allergies. All other systems reviewed and are negative. Prior to Visit Medications    Medication Sig Taking?  Authorizing Provider   pantoprazole (PROTONIX) 40 MG tablet Take 1 tablet by mouth 2 times daily Take 2 times daily Yes Gilbert Reach, APRN - NP   cetirizine (ZYRTEC) 10 MG tablet as needed   Historical Provider, MD   diclofenac-miSOPROStol (ARTHROTEC 75) 75-0.2 MG per tablet as needed   Historical Provider, MD   nabumetone (RELAFEN) 500 MG tablet as needed   Historical Provider, MD   hydrocortisone 2.5 % cream as needed   Historical Provider, MD hydrocortisone (ANUSOL-HC) 2.5 % CREA rectal cream as needed   Historical Provider, MD   Lifitegrast Lorriane Plater) 5 % SOLN daily   Historical Provider, MD   ergocalciferol (ERGOCALCIFEROL) 1.25 MG (78871 UT) capsule once a week  Historical Provider, MD   gabapentin (NEURONTIN) 300 MG capsule Take 1 capsule by mouth 2 times daily for 180 days. Patient taking differently: Take 300 mg by mouth daily.    Eliza Fox DO   Adalimumab 40 MG/0.4ML PNKT Inject 40 mg into the skin every 14 days Every 7- 10 days  Historical Provider, MD   dicyclomine (BENTYL) 10 MG capsule Take 1 capsule by mouth 4 times daily  Patient taking differently: Take 10 mg by mouth daily   Ac Brown MD   prednisoLONE 5 MG TABS Take by mouth as needed   Historical Provider, MD   estradiol (ESTRACE) 2 MG tablet TAKE 1 TABLET BY MOUTH DAILY  Sonu Núñez MD   fluticasone (FLONASE) 50 MCG/ACT nasal spray 2 sprays by Nasal route daily  Patient taking differently: 2 sprays by Nasal route as needed   Sonu Núñez MD   azelastine (ASTELIN) 0.1 % nasal spray 2 sprays by Nasal route 2 times daily  Patient taking differently: 2 sprays by Nasal route as needed   Sonu Núñez MD   PROCTOSOL HC 2.5 % rectal cream Apply BID as needed for hemorrhoids  Sonu Núñez MD   naproxen (NAPROSYN) 500 MG tablet as needed   Historical Provider, MD       Social History     Tobacco Use    Smoking status: Never Smoker    Smokeless tobacco: Never Used   Vaping Use    Vaping Use: Never used   Substance Use Topics    Alcohol use: No     Alcohol/week: 0.0 standard drinks    Drug use: No        Allergies   Allergen Reactions    Codeine Itching and Rash    Morphine      migraines    Sulfa Antibiotics    ,   Past Medical History:   Diagnosis Date    Allergic rhinitis     Asthma     Endometriosis     GERD (gastroesophageal reflux disease)     History of basal cell carcinoma 08/30/2021    Forehead    Osteopenia 2013    RA [facility]) ([facility] [address]     Sjogren's disease [facility])    ,   Past Surgical History:   Procedure Laterality Date    COLONOSCOPY  10/17/2016    Dr Jacki Cassidy, 5 yr recall    COLONOSCOPY N/A 02/05/2021    Dr Michael Mueller yr recall    ENDOSCOPY, COLON, DIAGNOSTIC      HYSTERECTOMY  1996    age 31    OVARY REMOVAL Bilateral 1996    age 31    TUBAL LIGATION      UPPER GASTROINTESTINAL ENDOSCOPY N/A 09/04/2020    Dr Elmer Cornelius-w/[id number]-Mzgdhzjaiwb, gastritis    UPPER GASTROINTESTINAL ENDOSCOPY N/A 02/05/2021    Dr Elmer Cornelius-w/qql-54D-Ijwsjicewhds 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, gastritis-Benign    UPPER GASTROINTESTINAL ENDOSCOPY N/A 02/05/2021    Dr Elmer Cornelius-w/mdb-84B-Bvhbukwaisgs 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, gastritis-Benign   ,   Social History     Tobacco Use    Smoking status: Never Smoker    Smokeless tobacco: Never Used   Vaping Use    Vaping Use: Never used   Substance Use Topics    Alcohol use: No     Alcohol/week: 0.0 standard drinks    Drug use: No   ,   Family History   Problem Relation Age of Onset    Uterine Cancer Mother 22    Stomach Cancer Mother     Liver Cancer Mother     Breast Cancer Maternal Grandmother 28    Ovarian Cancer Maternal Aunt 28    Cancer Maternal Uncle 27        melanoma    Cancer Maternal Uncle         liver    Liver Cancer Maternal Uncle     Colon Cancer Neg Hx     Esophageal Cancer Neg Hx     Liver Disease Neg Hx        PHYSICAL EXAMINATION:  [ INSTRUCTIONS:  \"[x]\" Indicates a positive item  \"[]\" Indicates a negative item  -- DELETE ALL ITEMS NOT EXAMINED]  Vital Signs: (As obtained by patient/caregiver or practitioner observation)    Blood pressure-  Heart rate-    Respiratory rate-    Temperature-  Pulse oximetry-     Constitutional: [x] Appears well-developed and well-nourished [x] No apparent distress      [] Abnormal-   Mental status  [x] Alert and awake  [x] Oriented to person/place/time [x]Able to follow commands      Eyes:  EOM    [x]  Normal  [] Abnormal-  Sclera [x]  Normal  [] Abnormal -         Discharge [x]  None visible  [] Abnormal -    HENT:   [x] Normocephalic, atraumatic. [] Abnormal   [x] Mouth/Throat: Mucous membranes are moist.     External Ears [x] Normal  [] Abnormal-     Neck: [x] No visualized mass     Pulmonary/Chest: [x] Respiratory effort normal.  [x] No visualized signs of difficulty breathing or respiratory distress        [] Abnormal-      Musculoskeletal:   [x] Normal gait with no signs of ataxia         [x] Normal range of motion of neck        [] Abnormal-       Neurological:        [x] No Facial Asymmetry (Cranial nerve 7 motor function) (limited exam to video visit)          [x] No gaze palsy        [] Abnormal-         Skin:        [x] No significant exanthematous lesions or discoloration noted on facial skin         [] Abnormal-            Psychiatric:       [x] Normal Affect [x] No Hallucinations        [] Abnormal-     Other pertinent observable physical exam findings-     ASSESSMENT/PLAN:  1. Esophageal stricture  2. Chronic GERD  3. Dysphagia, unspecified type    - Increase PPI to BID x 3 months, then back to once daily  - Follow up in 3-4 months to check symptoms  - Call if no improvement in symptoms after 4 weeks  - Anti reflux precautions  - Consider repeat EGD with dilation and/or esophageal manometry      Return in about 3 months (around 4/28/2022). Isaac Zavala was evaluated through a synchronous (real-time) audio-video encounter. The patient (or guardian if applicable) is aware that this is a billable service, which includes applicable co-pays. This Virtual Visit was conducted with patient's (and/or legal guardian's) consent. The visit was conducted pursuant to the emergency declaration under the [facility], [address] [place] [address] authority and the [employer] General Act. Patient identification was verified, and a caregiver was present when appropriate.  The patient was located at home in a state where the provider was licensed to provide care. Total time spent on this encounter: Not billed by time    --TANA Becker NP on 1/28/2022 at 11:43 AM    An electronic signature was used to authenticate this note.

## 2022-03-07 RX ORDER — AMOXICILLIN AND CLAVULANATE POTASSIUM 875; 125 MG/1; MG/1
1 TABLET, FILM COATED ORAL 2 TIMES DAILY
Qty: 20 TABLET | Refills: 0 | Status: SHIPPED | OUTPATIENT
Start: 2022-03-07 | End: 2022-03-17

## 2022-03-07 NOTE — TELEPHONE ENCOUNTER
Augmentin called in for sinusitis patient advised to call if this worsens.       Electronically signed by Alireza Lyons PA-C on 3/7/22 at 1:35 PM CST

## 2022-03-31 LAB
ALBUMIN SERPL-MCNC: 4.5 G/DL (ref 3.5–5.2)
ALP BLD-CCNC: 75 U/L (ref 35–104)
ALT SERPL-CCNC: 18 U/L (ref 5–33)
ANION GAP SERPL CALCULATED.3IONS-SCNC: 12 MMOL/L (ref 7–19)
AST SERPL-CCNC: 15 U/L (ref 5–32)
BASOPHILS ABSOLUTE: 0 K/UL (ref 0–0.2)
BASOPHILS RELATIVE PERCENT: 0.5 % (ref 0–1)
BILIRUB SERPL-MCNC: 0.5 MG/DL (ref 0.2–1.2)
BUN BLDV-MCNC: 17 MG/DL (ref 6–20)
CALCIUM SERPL-MCNC: 9.5 MG/DL (ref 8.6–10)
CHLORIDE BLD-SCNC: 100 MMOL/L (ref 98–111)
CHOLESTEROL, TOTAL: 201 MG/DL (ref 160–199)
CO2: 27 MMOL/L (ref 22–29)
CREAT SERPL-MCNC: 0.6 MG/DL (ref 0.5–0.9)
EOSINOPHILS ABSOLUTE: 0.1 K/UL (ref 0–0.6)
EOSINOPHILS RELATIVE PERCENT: 1.2 % (ref 0–5)
GFR AFRICAN AMERICAN: >59
GFR NON-AFRICAN AMERICAN: >60
GLUCOSE BLD-MCNC: 88 MG/DL (ref 74–109)
HCT VFR BLD CALC: 39.8 % (ref 37–47)
HDLC SERPL-MCNC: 64 MG/DL (ref 65–121)
HEMOGLOBIN: 12.9 G/DL (ref 12–16)
IMMATURE GRANULOCYTES #: 0 K/UL
LDL CHOLESTEROL CALCULATED: 126 MG/DL
LYMPHOCYTES ABSOLUTE: 1.8 K/UL (ref 1.1–4.5)
LYMPHOCYTES RELATIVE PERCENT: 30 % (ref 20–40)
MCH RBC QN AUTO: 30.6 PG (ref 27–31)
MCHC RBC AUTO-ENTMCNC: 32.4 G/DL (ref 33–37)
MCV RBC AUTO: 94.3 FL (ref 81–99)
MONOCYTES ABSOLUTE: 0.4 K/UL (ref 0–0.9)
MONOCYTES RELATIVE PERCENT: 7.5 % (ref 0–10)
NEUTROPHILS ABSOLUTE: 3.6 K/UL (ref 1.5–7.5)
NEUTROPHILS RELATIVE PERCENT: 60.6 % (ref 50–65)
PDW BLD-RTO: 13.3 % (ref 11.5–14.5)
PLATELET # BLD: 220 K/UL (ref 130–400)
PMV BLD AUTO: 11.6 FL (ref 9.4–12.3)
POTASSIUM SERPL-SCNC: 4.2 MMOL/L (ref 3.5–5)
RBC # BLD: 4.22 M/UL (ref 4.2–5.4)
SODIUM BLD-SCNC: 139 MMOL/L (ref 136–145)
T4 TOTAL: 6.3 UG/DL (ref 4.5–11.7)
TOTAL PROTEIN: 6.7 G/DL (ref 6.6–8.7)
TRIGL SERPL-MCNC: 54 MG/DL (ref 0–149)
TSH SERPL DL<=0.05 MIU/L-ACNC: 1.67 UIU/ML (ref 0.27–4.2)
URIC ACID, SERUM: 3.6 MG/DL (ref 2.4–5.7)
VITAMIN D 25-HYDROXY: 57.2 NG/ML
WBC # BLD: 5.9 K/UL (ref 4.8–10.8)

## 2022-04-01 LAB — T3 TOTAL: 102 NG/DL (ref 80–200)

## 2022-04-07 DIAGNOSIS — M54.50 CHRONIC BILATERAL LOW BACK PAIN WITHOUT SCIATICA: ICD-10-CM

## 2022-04-07 DIAGNOSIS — G89.29 CHRONIC BILATERAL LOW BACK PAIN WITHOUT SCIATICA: ICD-10-CM

## 2022-04-07 RX ORDER — GABAPENTIN 300 MG/1
CAPSULE ORAL
Qty: 60 CAPSULE | Refills: 0 | Status: SHIPPED | OUTPATIENT
Start: 2022-04-07 | End: 2022-10-31 | Stop reason: SDUPTHER

## 2022-04-07 NOTE — TELEPHONE ENCOUNTER
Requested Prescriptions     Pending Prescriptions Disp Refills    gabapentin (NEURONTIN) 300 MG capsule [Pharmacy Med Name: GABAPENTIN 300MG CAPSULES] 60 capsule 5     Sig: TAKE 1 CAPSULE BY MOUTH TWICE DAILY       Last Office Visit:  11/1/2021  Next Office Visit:  5/2/2022  Last Medication Refill:  6/29/21 with 5 refills    Gerry Castillo up to date:  4/7/22    *RX updated to reflect   4/7/22  fill date*    Primitivo chandler

## 2022-05-02 ENCOUNTER — OFFICE VISIT (OUTPATIENT)
Dept: NEUROSURGERY | Age: 52
End: 2022-05-02
Payer: OTHER GOVERNMENT

## 2022-05-02 VITALS
SYSTOLIC BLOOD PRESSURE: 112 MMHG | DIASTOLIC BLOOD PRESSURE: 66 MMHG | HEART RATE: 71 BPM | BODY MASS INDEX: 23.57 KG/M2 | OXYGEN SATURATION: 98 % | WEIGHT: 133 LBS | HEIGHT: 63 IN

## 2022-05-02 DIAGNOSIS — R90.89 ABNORMAL BRAIN MRI: ICD-10-CM

## 2022-05-02 DIAGNOSIS — R41.3 MEMORY LOSS: ICD-10-CM

## 2022-05-02 DIAGNOSIS — M54.50 CHRONIC BILATERAL LOW BACK PAIN WITHOUT SCIATICA: ICD-10-CM

## 2022-05-02 DIAGNOSIS — R20.0 NUMBNESS: Primary | ICD-10-CM

## 2022-05-02 DIAGNOSIS — G89.29 CHRONIC BILATERAL LOW BACK PAIN WITHOUT SCIATICA: ICD-10-CM

## 2022-05-02 DIAGNOSIS — R51.9 HEADACHE, UNSPECIFIED HEADACHE TYPE: ICD-10-CM

## 2022-05-02 DIAGNOSIS — M25.562 CHRONIC PAIN OF LEFT KNEE: ICD-10-CM

## 2022-05-02 DIAGNOSIS — R53.1 WEAKNESS: ICD-10-CM

## 2022-05-02 DIAGNOSIS — G89.29 CHRONIC PAIN OF LEFT KNEE: ICD-10-CM

## 2022-05-02 PROCEDURE — 99214 OFFICE O/P EST MOD 30 MIN: CPT | Performed by: PSYCHIATRY & NEUROLOGY

## 2022-08-11 RX ORDER — PANTOPRAZOLE SODIUM 40 MG/1
40 TABLET, DELAYED RELEASE ORAL 2 TIMES DAILY
Qty: 60 TABLET | Refills: 3 | Status: SHIPPED | OUTPATIENT
Start: 2022-08-11

## 2022-10-31 DIAGNOSIS — G89.29 CHRONIC BILATERAL LOW BACK PAIN WITHOUT SCIATICA: ICD-10-CM

## 2022-10-31 DIAGNOSIS — M54.50 CHRONIC BILATERAL LOW BACK PAIN WITHOUT SCIATICA: ICD-10-CM

## 2022-10-31 RX ORDER — GABAPENTIN 300 MG/1
CAPSULE ORAL
Qty: 60 CAPSULE | Refills: 5 | Status: SHIPPED | OUTPATIENT
Start: 2022-10-31 | End: 2023-04-28

## 2023-03-07 ENCOUNTER — OFFICE VISIT (OUTPATIENT)
Dept: NEUROLOGY | Age: 53
End: 2023-03-07
Payer: OTHER GOVERNMENT

## 2023-03-07 VITALS
HEART RATE: 78 BPM | BODY MASS INDEX: 23.57 KG/M2 | OXYGEN SATURATION: 98 % | WEIGHT: 133 LBS | SYSTOLIC BLOOD PRESSURE: 108 MMHG | DIASTOLIC BLOOD PRESSURE: 64 MMHG | HEIGHT: 63 IN

## 2023-03-07 DIAGNOSIS — R53.1 WEAKNESS: ICD-10-CM

## 2023-03-07 DIAGNOSIS — G89.29 CHRONIC BILATERAL LOW BACK PAIN WITHOUT SCIATICA: ICD-10-CM

## 2023-03-07 DIAGNOSIS — M54.50 CHRONIC BILATERAL LOW BACK PAIN WITHOUT SCIATICA: ICD-10-CM

## 2023-03-07 DIAGNOSIS — R51.9 HEADACHE, UNSPECIFIED HEADACHE TYPE: ICD-10-CM

## 2023-03-07 DIAGNOSIS — R20.0 NUMBNESS: Primary | ICD-10-CM

## 2023-03-07 DIAGNOSIS — R41.3 MEMORY LOSS: ICD-10-CM

## 2023-03-07 PROCEDURE — 99214 OFFICE O/P EST MOD 30 MIN: CPT | Performed by: PSYCHIATRY & NEUROLOGY

## 2023-03-07 RX ORDER — PREDNISONE 1 MG/1
1 TABLET ORAL DAILY
COMMUNITY
Start: 2023-02-22

## 2023-03-07 RX ORDER — GABAPENTIN 300 MG/1
CAPSULE ORAL
Qty: 60 CAPSULE | Refills: 5 | Status: SHIPPED | OUTPATIENT
Start: 2023-03-07 | End: 2023-09-02

## 2023-03-07 RX ORDER — FOLIC ACID 1 MG/1
1 TABLET ORAL DAILY
COMMUNITY
Start: 2023-02-14

## 2023-03-07 NOTE — PROGRESS NOTES
[facility] Neurology Office Note      Patient:   Soha Burroughs  MR#:    [id number]  Account Number:                         YOB: 1970  Date of Evaluation:  3/7/2023  Time of Note:                          3:08 PM  Primary/Referring Physician:  Jeremiah Paige DO   Consulting Physician:  Frankey Slot, DO    FOLLOW UP VISIT    Chief Complaint   Patient presents with    [address]    Soha Burroughs is a 47y.o. year old female here for numbness, headaches, memory loss, and weakness. Doing about the same since last seen overall, some waxing and waning. Symptoms started a year or so ago. She notes memory loss, primarily short term. Also notes widespread numbness and tingling and also noting worsening weakness in the arms and legs. Some gait difficulty noted as well. She does note some neck pain which waxes and wane. She notes a history of spina bifuda occulta. MRI brain with mild nonspecific white matter changes otherwise negative. Denies anxiety or depression. Does have an underlying history of RA and possible Sjogren's. Now off Leflunomide given LFT abnormalities. On prednisone currently. Has stopped plaquenil. Followed at [facility]. Workup as below. On neurontin which has helped. Repeat MRI largely negative, very minimal white matter changes noted, non-specific. On Humira and prednisone prn.      Past Medical History:   Diagnosis Date    Allergic rhinitis     Asthma     Endometriosis     GERD (gastroesophageal reflux disease)     History of basal cell carcinoma 08/30/2021    Forehead    Osteopenia 2013    RA (rheumatoid arthritis) ([facility] [address]     Sjogren's disease ([address]        Past Surgical History:   Procedure Laterality Date    COLONOSCOPY  10/17/2016    Dr Ronald Jara, 5 yr recall    COLONOSCOPY N/A 02/05/2021    Dr Hines All yr recall    ENDOSCOPY, COLON, DIAGNOSTIC      HYSTERECTOMY (CERVIX STATUS UNKNOWN)  1996    age 31    OVARY REMOVAL Bilateral 1996    age 31    TUBAL LIGATION      UPPER GASTROINTESTINAL ENDOSCOPY N/A 09/04/2020    Dr TEMITOPE Cornelius-w/[id number]-76I-Mshbvweyiga, gastritis    UPPER GASTROINTESTINAL ENDOSCOPY N/A 02/05/2021    Dr Olivier Cornelius-w/jbr-37V-Fhezdkdbjylj 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, gastritis-Benign    UPPER GASTROINTESTINAL ENDOSCOPY N/A 02/05/2021    Dr TEMITOPE Cornelius-w/ubo-92W-Hxnaawchrjdu 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, gastritis-Benign       Family History   Problem Relation Age of Onset    Uterine Cancer Mother 22    Stomach Cancer Mother     Liver Cancer Mother     Breast Cancer Maternal Grandmother 28    Ovarian Cancer Maternal Aunt 28    Cancer Maternal Uncle 27        melanoma    Cancer Maternal Uncle         liver    Liver Cancer Maternal Uncle     Colon Cancer Neg Hx     Esophageal Cancer Neg Hx     Liver Disease Neg Hx        Social History     Socioeconomic History    Marital status:      Spouse name: Not on file    Number of children: Not on file    Years of education: Not on file    Highest education level: Not on file   Occupational History    Not on file   Tobacco Use    Smoking status: Never    Smokeless tobacco: Never   Vaping Use    Vaping Use: Never used   Substance and Sexual Activity    Alcohol use: No     Alcohol/week: 0.0 standard drinks    Drug use: No    Sexual activity: Yes     Partners: Male   Other Topics Concern    Not on file   Social History Narrative    Not on file     Social Determinants of Health     Financial Resource Strain: Not on file   Food Insecurity: Not on file   Transportation Needs: Not on file   Physical Activity: Not on file   Stress: Not on file   Social Connections: Not on file   Intimate Partner Violence: Not on file   Housing Stability: Not on file       Current Outpatient Medications   Medication Sig Dispense Refill    folic acid (FOLVITE) 1 MG tablet Take 1 tablet by mouth daily      predniSONE (DELTASONE) 5 MG tablet Take 1 tablet by mouth daily      Adalimumab (HUMIRA PEN-PSOR/UVEIT STARTER SC) Inject into the skin      mupirocin (BACTROBAN NASAL) 2 % nasal ointment Apply to facial lesion twice a day for 10 days 1 g 2    gabapentin (NEURONTIN) 300 MG capsule TAKE 1 CAPSULE BY MOUTH TWICE DAILY 60 capsule 5    pantoprazole (PROTONIX) 40 MG tablet Take 1 tablet by mouth in the morning and 1 tablet before bedtime. Take 2 times daily. 60 tablet 3    cetirizine (ZYRTEC) 10 MG tablet as needed       hydrocortisone 2.5 % cream as needed       hydrocortisone (ANUSOL-HC) 2.5 % CREA rectal cream as needed       Lifitegrast (XIIDRA) 5 % SOLN daily       ergocalciferol (ERGOCALCIFEROL) 1.25 MG (57759 UT) capsule once a week      dicyclomine (BENTYL) 10 MG capsule Take 1 capsule by mouth 4 times daily (Patient taking differently: Take 10 mg by mouth daily) 120 capsule 5    estradiol (ESTRACE) 2 MG tablet TAKE 1 TABLET BY MOUTH DAILY 30 tablet 11    fluticasone (FLONASE) 50 MCG/ACT nasal spray 2 sprays by Nasal route daily (Patient taking differently: 2 sprays by Nasal route as needed) 1 Bottle 11    azelastine (ASTELIN) 0.1 % nasal spray 2 sprays by Nasal route 2 times daily (Patient taking differently: 2 sprays by Nasal route as needed) 1 Bottle 11    PROCTOSOL HC 2.5 % rectal cream Apply BID as needed for hemorrhoids 1 Tube 6     No current facility-administered medications for this visit.        Allergies   Allergen Reactions    Codeine Itching and Rash    Morphine      migraines    Sulfa Antibiotics        REVIEW OF SYSTEMS    Constitutional: []Fever []Sweat []Chills [] Recent Injury [x] Denies all unless marked  HEENT:[]Headache  [] Head Injury/Hearing Loss  [] Sore Throat  [] Ear Ache/Dizziness  [x] Denies all unless marked  Spine:  [] Neck pain  [] Back pain  [] Sciaticia  [x] Denies all unless marked  Cardiovascular:[]Heart Disease []Chest Pain [] Palpitations  [x] Denies all unless marked  Pulmonary: []Shortness of Breath []Cough   [x] Denies all unless josé miguel  Gastrointestinal: []Nausea  []Vomiting  []Abdominal Pain  []Constipation  []Diarrhea  []Dark Bloody Stools  [x] Denies all unless marked  Psychiatric/Behavioral:[] Depression [] Anxiety [x] Denies all unless marked  Genitourinary:   [] Frequency  [] Urgency  [] Incontinence [] Pain with Urination  [x] Denies all unless marked  Extremities: []Pain  []Swelling  [x] Denies all unless marked  Musculoskeletal: [x] Muscle Pain  [x] Joint Pain  [x] Arthritis [x] Muscle Cramps [x] Muscle Twitches  [x] Denies all unless marked  Sleep: [x] Insomnia [] Snoring [] Restless Legs [] Sleep Apnea  [] Daytime Sleepiness  [x] Denies all unless marked  Skin:[] Rash [] Skin Discoloration [x] Denies all unless marked   Neurological: []Visual Disturbance/Memory Loss [x] Loss of Balance [] Slurred Speech/Weakness [] Seizures  [x] Vertigo/Dizziness [x] Denies all unless marked      I have reviewed the above ROS with the patient and agree with the ROS as documented above. PHYSICAL EXAM    Constitutional -   /64   Pulse 78   Ht 5' 3\" (1.6 m)   Wt 133 lb (60.3 kg)   SpO2 98%   BMI 23.56 kg/m²   General appearance: No acute distress   EYES -   Conjunctiva normal  Pupillary exam as below, see CN exam in the neurologic exam  ENT-    No scars, masses, or lesions over external nose or ears  Hearing normal bilaterally to finger rub  Cardiovascular -   No clubbing, cyanosis, or edema   Pulmonary-   Good expansion, normal effort without use of accessory muscles  Musculoskeletal -   No significant wasting of muscles noted  Gait as below, see gait exam in the neurologic exam  Muscle strength, tone, stability as below. No bony deformities  Skin -   Warm, dry, and intact to inspection and palpation.     No rash, erythema, or pallor  Psychiatric -   Mood, affect, and behavior appear normal    Memory as below see mental status examination in the neurologic exam    NEUROLOGICAL EXAM    Mental status   [x]Awake, alert, oriented [x]Affect attention and concentration appear appropriate  []Recent and remote memory appears unremarkable  [x]Speech normal without dysarthria or aphasia, comprehension and repetition intact. COMMENTS: Mild cognitive loss   Cranial Nerves [x]No VF deficit to confrontation  [x]PERRLA, EOMI, no nystagmus, conjugate eye movements, no ptosis  [x]Face symmetric  [x]Facial sensation intact  [x]Tongue midline no atrophy or fasciculations present  [x]Palate midline, hearing to finger rub normal bilaterally  [x]Shoulder shrug and SCM testing normal bilaterally  COMMENTS:   Motor   []5/5 strength x 4 extremities  [x]Normal bulk and tone  [x]No tremor present  [x]No rigidity or bradykinesia noted  COMMENTS: 4/5 globally    Sensory  []Sensation intact to light touch, pin prick, vibration, and proprioception BLE  []Sensation intact to light touch, pin prick, vibration, and proprioception BUE  COMMENTS: Mild decreased LT, Vib LLE>RLE, PP ok. Coordination [x]FTN normal bilaterally   []HTS normal bilaterally  []MENDY normal bilaterally. COMMENTS:   Reflexes  [x]Symmetric and non-pathological, slightly brisk  [x]Toes down going bilaterally  [x]No clonus present  COMMENTS:   Gait                  [x]Normal steady gait    []Ataxic    []Spastic     []Magnetic     []Shuffling  COMMENTS:       LABS RECORD AND IMAGING REVIEW (As below and per HPI)    Lab Results   Component Value Date    WBC 5.9 03/31/2022    HGB 12.9 03/31/2022    HCT 39.8 03/31/2022    MCV 94.3 03/31/2022     03/31/2022     Lab Results   Component Value Date     03/31/2022    K 4.2 03/31/2022     03/31/2022    CO2 27 03/31/2022    BUN 17 03/31/2022    CREATININE 0.6 03/31/2022    GLUCOSE 88 03/31/2022    CALCIUM 9.5 03/31/2022    PROT 6.7 03/31/2022    LABALBU 4.5 03/31/2022    BILITOT 0.5 03/31/2022    ALKPHOS 75 03/31/2022    AST 15 03/31/2022    ALT 18 03/31/2022    LABGLOM >60 03/31/2022    GFRAA >59 03/31/2022     Records reviewed.      MRI with minimal small vessel disease noted, nonspecific. Labs negative outside of mild LFT abnormalities. MRI L-spine with mild ddd, no overt spinal stenosis, no nf narrowing. NCS/EMG normal x 4 ext     MRI C-spine 2018 with mild ddd    MRI brain normal 11/2021      ASSESSMENT:    Samina Lind is a 47y.o. year old female here for numbness, weakness, headaches, memory loss. MRI with nonspecific white matter changes, no enhancing lesions and not overtly suggestive of demyelinating disease. Prior repeat MRI stable, no progression noted. History of underlying RA and spinal bifida occulta noted. MRI C/L-spine, NCS/EMG, labs largely negative. Left knee pain noted, ortho following, possible Baker's cyst noted per patient. PLAN:  1. Hold off on LP for now as MS felt somewhat unlikely given MRI. May reconsider if worsens. Will get follow up MRI if worsens on down the line as well. 2.  Follow up with Rheumatology as directed. 3.  Continue Neurontin for symptomatic treatment. 4.  Follow up with primary for mild LFT abnormalities. Rheumatology stopped Leflunomide. 5.  Ortho following left knee pain.        Belva Primrose, DO  Board Certified Neurology

## 2023-05-25 RX ORDER — AZELASTINE 1 MG/ML
2 SPRAY, METERED NASAL 2 TIMES DAILY
Qty: 120 ML | Refills: 3 | Status: SHIPPED | OUTPATIENT
Start: 2023-05-25

## 2023-05-25 RX ORDER — CETIRIZINE HYDROCHLORIDE 10 MG/1
10 TABLET ORAL DAILY
Qty: 30 TABLET | Refills: 5 | Status: SHIPPED | OUTPATIENT
Start: 2023-05-25 | End: 2023-06-24

## 2023-05-25 RX ORDER — TRIAMCINOLONE ACETONIDE 55 UG/1
2 SPRAY, METERED NASAL 2 TIMES DAILY
Qty: 16 G | Refills: 3 | Status: SHIPPED | OUTPATIENT
Start: 2023-05-25

## 2023-05-25 RX ORDER — MONTELUKAST SODIUM 10 MG/1
10 TABLET ORAL DAILY
Qty: 30 TABLET | Refills: 2 | Status: SHIPPED | OUTPATIENT
Start: 2023-05-25

## 2023-05-25 NOTE — PROGRESS NOTES
Refills requested and sent to Janeth at [facility]    Electronically signed by Evon Freeman PA-C on 5/25/23 at 3:36 PM CDT

## 2023-08-21 RX ORDER — MONTELUKAST SODIUM 10 MG/1
10 TABLET ORAL DAILY
Qty: 30 TABLET | Refills: 2 | Status: SHIPPED | OUTPATIENT
Start: 2023-08-21

## 2023-09-06 ENCOUNTER — OFFICE VISIT (OUTPATIENT)
Dept: NEUROLOGY | Age: 53
End: 2023-09-06
Payer: OTHER GOVERNMENT

## 2023-09-06 VITALS
DIASTOLIC BLOOD PRESSURE: 66 MMHG | BODY MASS INDEX: 23.57 KG/M2 | WEIGHT: 133 LBS | SYSTOLIC BLOOD PRESSURE: 102 MMHG | HEIGHT: 63 IN | OXYGEN SATURATION: 97 % | HEART RATE: 61 BPM

## 2023-09-06 DIAGNOSIS — R41.3 MEMORY LOSS: ICD-10-CM

## 2023-09-06 DIAGNOSIS — R51.9 HEADACHE, UNSPECIFIED HEADACHE TYPE: ICD-10-CM

## 2023-09-06 DIAGNOSIS — R20.0 NUMBNESS: Primary | ICD-10-CM

## 2023-09-06 PROCEDURE — 99214 OFFICE O/P EST MOD 30 MIN: CPT | Performed by: NURSE PRACTITIONER

## 2023-09-06 RX ORDER — ADALIMUMAB 40MG/0.4ML
KIT SUBCUTANEOUS
COMMUNITY
Start: 2023-06-20

## 2023-09-06 NOTE — PROGRESS NOTES
REVIEW OF SYSTEMS    Constitutional: []Fever []Sweat []Chills [] Recent Injury [x] Denies all unless marked  HEENT:[]Headache  [] Head Injury/Hearing Loss  [] Sore Throat  [] Ear Ache/Dizziness  [x] Denies all unless marked  Spine:  [] Neck pain  [] Back pain  [] Sciaticia  [x] Denies all unless marked  Cardiovascular:[]Heart Disease []Chest Pain [] Palpitations  [x] Denies all unless marked  Pulmonary: []Shortness of Breath []Cough   [x] Denies all unless marke  Gastrointestinal: []Nausea  []Vomiting  []Abdominal Pain  []Constipation  []Diarrhea  []Dark Bloody Stools  [x] Denies all unless marked  Psychiatric/Behavioral:[] Depression [] Anxiety [x] Denies all unless marked  Genitourinary:   [] Frequency  [] Urgency  [] Incontinence [] Pain with Urination  [x] Denies all unless marked  Extremities: []Pain  []Swelling  [x] Denies all unless marked  Musculoskeletal: [x] Muscle Pain  [] Joint Pain  [] Arthritis [] Muscle Cramps [] Muscle Twitches  [x] Denies all unless marked  Sleep: [] Insomnia [] Snoring [] Restless Legs [] Sleep Apnea  [] Daytime Sleepiness  [x] Denies all unless marked  Skin:[] Rash [] Skin Discoloration [x] Denies all unless marked   Neurological: [x]Visual Disturbance/Memory Loss [] Loss of Balance [] Slurred Speech/Weakness [] Seizures  [] Vertigo/Dizziness [x] Denies all unless marked

## 2023-09-06 NOTE — PROGRESS NOTES
[facility] Neurology Office Note      Patient:   Rosina Desai  MR#:    [id number]  Account Number:                         YOB: 1970  Date of Evaluation:  9/7/2023  Time of Note:                          11:10 AM  Primary/Referring Physician:  Mandy Jean DO   Consulting Physician:  TANA Lockhart    FOLLOW UP-New to Provider       Chief Complaint   Patient presents with    Follow-up     numbness       HISTORY OF PRESENT ILLNESS    Rosina Desai is a 47y.o. year old female here for follow up of numbness, headaches, memory loss, pain. Is followed by Dr. Jasvir Bashir. She states she is having good days and bad days. She reports yesterday she was having issues with walking due to pain pain issues. Has known chronic knee issues, following with Dr. Ramila Rincon. Does have spots to vision at times. She states that numbness and tingling are overall improved. Still following with Rheumatology for RA and Sjogren's syndrome. She is taking steroids when she has a flare. She denies any new falls. Is having some lightheadedness lately, denies syncope. She states when this occurs vision has ring quality to it. Is aborted by sitting. Occurs typically with standing. Headache have been well controlled. She is taking gabapentin once daily typically, causes her sedation. On bad days she will take it twice daily. Symptoms started over a year ago. Still some mild short-term memory loss, no overt progression. She has underlying history of spina bifida occulta. Has had past MRI brain with mild nonspecific white matter changes otherwise negative, repeat unchanged.       Past Medical History:   Diagnosis Date    Allergic rhinitis     Asthma     Endometriosis     GERD (gastroesophageal reflux disease)     History of basal cell carcinoma 08/30/2021    Forehead    Osteopenia 2013    [facility] ([facility]     Sjogren's disease ([address])        Past Surgical History:   Procedure Laterality Date

## 2024-03-06 ENCOUNTER — OFFICE VISIT (OUTPATIENT)
Dept: NEUROLOGY | Age: 54
End: 2024-03-06
Payer: OTHER GOVERNMENT

## 2024-03-06 VITALS
SYSTOLIC BLOOD PRESSURE: 122 MMHG | HEART RATE: 67 BPM | WEIGHT: 133 LBS | HEIGHT: 63 IN | OXYGEN SATURATION: 97 % | BODY MASS INDEX: 23.57 KG/M2 | DIASTOLIC BLOOD PRESSURE: 74 MMHG

## 2024-03-06 DIAGNOSIS — M54.50 CHRONIC BILATERAL LOW BACK PAIN WITHOUT SCIATICA: ICD-10-CM

## 2024-03-06 DIAGNOSIS — R51.9 HEADACHE, UNSPECIFIED HEADACHE TYPE: ICD-10-CM

## 2024-03-06 DIAGNOSIS — R53.1 WEAKNESS: ICD-10-CM

## 2024-03-06 DIAGNOSIS — R41.3 MEMORY LOSS: ICD-10-CM

## 2024-03-06 DIAGNOSIS — G89.29 CHRONIC BILATERAL LOW BACK PAIN WITHOUT SCIATICA: ICD-10-CM

## 2024-03-06 DIAGNOSIS — R20.0 NUMBNESS: Primary | ICD-10-CM

## 2024-03-06 PROCEDURE — 99214 OFFICE O/P EST MOD 30 MIN: CPT | Performed by: PSYCHIATRY & NEUROLOGY

## 2024-03-06 RX ORDER — VALACYCLOVIR HYDROCHLORIDE 1 G/1
1000 TABLET, FILM COATED ORAL DAILY
COMMUNITY
Start: 2024-02-29 | End: 2024-03-08

## 2024-03-06 RX ORDER — BELIMUMAB 200 MG/ML
SOLUTION SUBCUTANEOUS WEEKLY
COMMUNITY
Start: 2023-12-26

## 2024-03-06 RX ORDER — ONDANSETRON 4 MG/1
4 TABLET, FILM COATED ORAL EVERY 8 HOURS PRN
COMMUNITY
Start: 2024-02-06

## 2024-03-06 RX ORDER — GABAPENTIN 300 MG/1
CAPSULE ORAL
Qty: 90 CAPSULE | Refills: 5 | Status: SHIPPED | OUTPATIENT
Start: 2024-03-06 | End: 2025-03-06

## 2024-03-06 NOTE — PROGRESS NOTES
[facility] Office Note      Patient:   Sean Quinn  MR#:    [id number]  Account Number:                         YOB: 1970  Date of Evaluation:  3/6/2024  Time of Note:                          1:45 PM  Primary/Referring Physician:  Enma Mohamud DO   Consulting Physician:  Jorge Gerber DO    FOLLOW UP VISIT    Chief Complaint   Patient presents with    6 Month Follow-Up     Patient just found out she has lupus     Discuss Medications       HISTORY OF PRESENT ILLNESS    Sean Quinn is a 53 y.o. year old female here for numbness, headaches, memory loss, and weakness.  Doing about the same since last seen overall, some waxing and waning. She notes memory loss, primarily short term.  Also notes widespread numbness and tingling and also noting worsening weakness in the arms and legs.  Some gait difficulty noted as well.  She does note some neck pain which waxes and wane.  She notes a history of spina bifuda occulta.  MRI brain with mild nonspecific white matter changes otherwise negative.  Denies anxiety or depression. Recently diagnosed with Lupus, followed at [facility], now off Leflunomide given LFT abnormalities. On prednisone currently.  Has stopped plaquenil.   Workup as below.  On neurontin which has helped. Repeat MRI largely negative, very minimal white matter changes noted, non-specific.       Past Medical History:   Diagnosis Date    Allergic rhinitis     Asthma     Endometriosis     GERD (gastroesophageal reflux disease)     History of basal cell carcinoma 08/30/2021    Forehead    Lupus (HCC)     Osteopenia 2013    RA (rheumatoid arthritis) (HCC)     Sjogren's disease (HCC)        Past Surgical History:   Procedure Laterality Date    COLONOSCOPY  10/17/2016    Dr Cano--open access-AVMs, 5 yr recall    COLONOSCOPY N/A 02/05/2021    Dr TEMITOPE Cornelius-10 yr recall    ENDOSCOPY, COLON, DIAGNOSTIC      HYSTERECTOMY (CERVIX STATUS UNKNOWN)  1996    age 26    OVARY REMOVAL

## 2024-03-15 ENCOUNTER — HOSPITAL ENCOUNTER (OUTPATIENT)
Dept: PHYSICAL THERAPY | Age: 54
Setting detail: THERAPIES SERIES
Discharge: HOME | End: 2024-03-15
Payer: OTHER GOVERNMENT

## 2024-03-15 PROCEDURE — 97110 THERAPEUTIC EXERCISES: CPT

## 2024-03-15 PROCEDURE — 97163 PT EVAL HIGH COMPLEX 45 MIN: CPT

## 2024-03-15 ASSESSMENT — PAIN SCALES - GENERAL: PAINLEVEL_OUTOF10: 7

## 2024-03-15 ASSESSMENT — PAIN DESCRIPTION - LOCATION: LOCATION: NECK;BACK

## 2024-03-15 NOTE — PROGRESS NOTES
4/5  R Elbow Extension: 4/5  Strength RLE  R Hip Flexion: 4/5  R Hip Extension: 4/5  R Knee Flexion: 4/5  R Knee Extension: 4/5  R Ankle Dorsiflexion: 4/5  R Ankle Plantar flexion: 4/5  R Ankle Eversion: 4/5  R Great Toe Extension: 4/5     Cervical Assessment   AROM Cervical Spine   Cervical flexion: 30  Cervical extension: 11  Cervical right lateral: 17  Cervical left lateral: 20  Cervical right rotation: 14  Cervical left rotation: 16         Special Tests: Modified Oswestry Disability 35% impairment       Balance/Gait Assessment(s) Performed:  Not Assessed    Additional Finding(s) (if applicable):           ASSESSMENT     Impression: Assessment: Ms. Quinn is seen today for initial PT evaluation of neck and low back pain.  She is alert and cooperative.  She demonstrates the following physical therapy related body structure, function, activity limitations: 1. Decreased CROM. 2. Diffuse weakness of UE' and LE's. 3. Multiple site of tenderness including neck, shoulders, lumbar paraspinal mm, and SIJ's. 4. Back and neck pain with resultant functional compromise.  She is aware of PT risk/benefit and is anxious to continue PT    Body Structures, Functions, Activity Limitations Requiring Skilled Therapeutic Intervention: Decreased functional mobility , Decreased ADL status, Decreased ROM, Decreased strength, Decreased high-level IADLs, Decreased endurance, Increased pain, Decreased posture    Statement of Medical Necessity: Physical Therapy is both indicated and medically necessary as outlined in the POC to increase the likelihood of meeting the functionally related goals stated below.     Patient's Activity Tolerance:        Patient's rehabilitation potential/prognosis is considered to be: Good    Factors which may impact rehabilitation potential include:          GOALS   Patient Goal(s): less pain  Short Term Goals Completed by 3-4 weeks Goal Status   Patient will improve ADL as demonstrated by mprovement of Oswestry

## 2024-03-20 ENCOUNTER — HOSPITAL ENCOUNTER (OUTPATIENT)
Dept: PHYSICAL THERAPY | Age: 54
Setting detail: THERAPIES SERIES
Discharge: HOME | End: 2024-03-20
Payer: OTHER GOVERNMENT

## 2024-03-20 PROCEDURE — 97110 THERAPEUTIC EXERCISES: CPT

## 2024-03-20 NOTE — PROGRESS NOTES
Physical Therapy: Daily Note   Patient: Sean Quinn (53 y.o. female)   Examination Date: 2024  No data recorded  No data recorded   :  1970 # of Visits since SOC:   2   MRN: [id number]  CSN: [id number] Start of Care Date:   3/15/2024   Insurance: Payor:  EAST / Plan:  EAST / Product Type: *No Product type* /   Insurance ID: [id number] - (Other) Secondary Insurance (if applicable):    Referring Physician: Loren Weston PA-C Emily Lopez   PCP: Enma Mohamud, DO Visits to Date/Visits Approved: 2 /      No Show/Cancelled Appts:   /       Medical Diagnosis: Low back pain, unspecified [M54.50] Neck and low back pain  Treatment Diagnosis: Band and neck pain        SUBJECTIVE EXAMINATION   Pain Level: Pain Screening  Patient Currently in Pain: Denies    Patient Comments: Subjective: Today is a good day.        TREATMENT     Exercises:      Treatment Reasoning    Exercise 1: 6 MWT- 1103' or LBE 5 min  -PATIENT HAS HISTORY OF LUPUS  Exercise 2: Supine, TA series, alone 10 sec x 10, with UE x 10, with LE x 10 and UE/LE x 5-low back popping wtih combo so only did 5  Exercise 3: Supine, lower trunk rotation, 10 reps  Exercise 4: Supine, bridges, 7 reps-reports some popping of low back that is uncomfortable.  Exercise 5: Supine, check leg length, treat accordingly, (at initial eval RLE>LLE.  Heel lift inserted in left shoe 3/15/2024)--pinching sensation in left groin with synergy tech.  -may need to use different application of synergy tech.  Exercise 6: Supine, 1 SKYE x 5 , 2 SKYE x 5  Exercise 7: Supine, pelvic tilt, 10 reps-NOT TODAY  Exercise 8: Supine, partial crunch, 10 reps-NOT TODAY  Exercise 9: Sitting, LAQ, 3#, 10 reps-NOT TODAY  Exercise 10: Sitting, sit to stand, mat to chair progression, 10 reps-NOT TODAY  Exercise 11: Standing, multifidus row, Paloff press, shoulder retraction, 10 reps-NOT TODAY  Exercise 12: Standing, hip abd, ext, mini squat, heel raise-NOT TODAY  Exercise 13:

## 2024-08-22 ENCOUNTER — OFFICE VISIT (OUTPATIENT)
Dept: GASTROENTEROLOGY | Age: 54
End: 2024-08-22
Payer: OTHER GOVERNMENT

## 2024-08-22 VITALS
OXYGEN SATURATION: 97 % | HEIGHT: 62 IN | DIASTOLIC BLOOD PRESSURE: 80 MMHG | BODY MASS INDEX: 23.19 KG/M2 | SYSTOLIC BLOOD PRESSURE: 115 MMHG | WEIGHT: 126 LBS | HEART RATE: 63 BPM

## 2024-08-22 DIAGNOSIS — R13.10 DYSPHAGIA, UNSPECIFIED TYPE: ICD-10-CM

## 2024-08-22 DIAGNOSIS — K21.9 CHRONIC GERD: ICD-10-CM

## 2024-08-22 DIAGNOSIS — K22.10 EROSIVE ESOPHAGITIS: Primary | ICD-10-CM

## 2024-08-22 PROCEDURE — 99214 OFFICE O/P EST MOD 30 MIN: CPT | Performed by: NURSE PRACTITIONER

## 2024-08-22 RX ORDER — VONOPRAZAN FUMARATE 26.72 MG/1
20 TABLET ORAL DAILY
Qty: 15 TABLET | Refills: 0 | Status: SHIPPED | COMMUNITY
Start: 2024-08-22

## 2024-08-22 RX ORDER — PANTOPRAZOLE SODIUM 40 MG/1
40 TABLET, DELAYED RELEASE ORAL 2 TIMES DAILY
Qty: 60 TABLET | Refills: 3 | Status: CANCELLED | OUTPATIENT
Start: 2024-08-22

## 2024-08-22 ASSESSMENT — ENCOUNTER SYMPTOMS
COUGH: 0
DIARRHEA: 0
ANAL BLEEDING: 0
VOMITING: 1
SHORTNESS OF BREATH: 0
CONSTIPATION: 0
RECTAL PAIN: 0
NAUSEA: 0
ABDOMINAL DISTENTION: 0
CHOKING: 0
TROUBLE SWALLOWING: 1
ABDOMINAL PAIN: 0
BLOOD IN STOOL: 0

## 2024-08-22 NOTE — PROGRESS NOTES
each nostril as directed 120 mL 3    triamcinolone (NASACORT) 55 MCG/ACT nasal inhaler 2 sprays by Each Nostril route 2 times daily (Patient taking differently: 2 sprays by Each Nostril route as needed) 16 g 3    predniSONE (DELTASONE) 5 MG tablet Take 1 tablet by mouth as needed (for Lupus)      pantoprazole (PROTONIX) 40 MG tablet Take 1 tablet by mouth in the morning and 1 tablet before bedtime. Take 2 times daily. 60 tablet 3    cetirizine (ZYRTEC) 10 MG tablet as needed       hydrocortisone (ANUSOL-HC) 2.5 % CREA rectal cream as needed       ergocalciferol (ERGOCALCIFEROL) 1.25 MG (19497 UT) capsule once a week      estradiol (ESTRACE) 2 MG tablet TAKE 1 TABLET BY MOUTH DAILY 30 tablet 11    fluticasone (FLONASE) 50 MCG/ACT nasal spray 2 sprays by Nasal route daily 1 Bottle 11    mupirocin (BACTROBAN NASAL) 2 % nasal ointment Apply to facial lesion twice a day for 10 days (Patient not taking: Reported on 3/6/2024) 1 g 2     No current facility-administered medications for this visit.       Allergies   Allergen Reactions    Codeine Itching and Rash    Morphine      migraines    Sulfa Antibiotics        Review of Systems   Constitutional:  Negative for activity change, appetite change, fatigue, fever and unexpected weight change.   HENT:  Positive for trouble swallowing.    Respiratory:  Negative for cough, choking and shortness of breath.    Cardiovascular:  Negative for chest pain.   Gastrointestinal:  Positive for vomiting. Negative for abdominal distention, abdominal pain, anal bleeding, blood in stool, constipation, diarrhea, nausea and rectal pain.        Reflux - chronic, uncontrolled   Allergic/Immunologic: Negative for food allergies.   All other systems reviewed and are negative.        Objective:     /80 (Site: Left Upper Arm)   Pulse 63   Ht 1.575 m (5' 2\")   Wt 57.2 kg (126 lb)   SpO2 97%   BMI 23.05 kg/m²     Physical Exam  Constitutional:       Appearance: Normal appearance.   HENT:

## 2024-08-30 ENCOUNTER — TELEPHONE (OUTPATIENT)
Dept: GASTROENTEROLOGY | Age: 54
End: 2024-08-30

## 2024-08-30 RX ORDER — OMEPRAZOLE 40 MG/1
40 CAPSULE, DELAYED RELEASE ORAL DAILY
Qty: 30 CAPSULE | Refills: 11 | Status: SHIPPED | OUTPATIENT
Start: 2024-08-30

## 2024-09-04 ENCOUNTER — OFFICE VISIT (OUTPATIENT)
Dept: NEUROLOGY | Age: 54
End: 2024-09-04
Payer: OTHER GOVERNMENT

## 2024-09-04 VITALS
HEIGHT: 62 IN | HEART RATE: 78 BPM | DIASTOLIC BLOOD PRESSURE: 68 MMHG | BODY MASS INDEX: 23.19 KG/M2 | WEIGHT: 126 LBS | SYSTOLIC BLOOD PRESSURE: 110 MMHG | OXYGEN SATURATION: 97 %

## 2024-09-04 DIAGNOSIS — M54.50 CHRONIC BILATERAL LOW BACK PAIN WITHOUT SCIATICA: ICD-10-CM

## 2024-09-04 DIAGNOSIS — G89.29 CHRONIC BILATERAL LOW BACK PAIN WITHOUT SCIATICA: ICD-10-CM

## 2024-09-04 DIAGNOSIS — R20.0 NUMBNESS: Primary | ICD-10-CM

## 2024-09-04 DIAGNOSIS — R41.3 MEMORY LOSS: ICD-10-CM

## 2024-09-04 DIAGNOSIS — R51.9 HEADACHE, UNSPECIFIED HEADACHE TYPE: ICD-10-CM

## 2024-09-04 DIAGNOSIS — R53.1 WEAKNESS: ICD-10-CM

## 2024-09-04 PROCEDURE — 99214 OFFICE O/P EST MOD 30 MIN: CPT | Performed by: PSYCHIATRY & NEUROLOGY

## 2024-09-04 RX ORDER — EPINEPHRINE 0.3 MG/.3ML
INJECTION SUBCUTANEOUS
COMMUNITY
Start: 2022-09-08

## 2024-09-04 RX ORDER — UBROGEPANT 100 MG/1
TABLET ORAL
Qty: 16 TABLET | Refills: 5 | Status: SHIPPED | OUTPATIENT
Start: 2024-09-04

## 2024-09-04 NOTE — PROGRESS NOTES
[facility] Office Note      Patient:   Sean Quinn  MR#:    [id number]  Account Number:                         YOB: 1970  Date of Evaluation:  9/4/2024  Time of Note:                          2:47 PM  Primary/Referring Physician:  Enma Mohamud DO   Consulting Physician:  Jorge Gerber DO    FOLLOW UP VISIT    Chief Complaint   Patient presents with    6 Month Follow-Up     Numbness    Headache     Headaches also dizziness        HISTORY OF PRESENT ILLNESS    Sean Quinn is a 53 y.o. year old female here for numbness, headaches, memory loss, and weakness.  Doing about the same since last seen overall, some waxing and waning. She notes memory loss, primarily short term, unchanged.  Also notes widespread numbness and tingling and also noting worsening weakness in the arms and legs, also unchanged.  Some gait difficulty noted as well.  She does note some neck pain which waxes and wane.  She notes a history of spina bifuda occulta.  MRI brain with mild nonspecific white matter changes otherwise negative.  Denies anxiety or depression. Recently diagnosed with Lupus, followed at [facility], now off Leflunomide given LFT abnormalities. On prednisone currently.  Has stopped plaquenil.   Workup as below.  On neurontin which has helped. Prior repeat MRI largely negative, very minimal white matter changes noted, non-specific.   Headaches seem worse.      Past Medical History:   Diagnosis Date    Allergic rhinitis     Asthma     Endometriosis     GERD (gastroesophageal reflux disease)     History of basal cell carcinoma 08/30/2021    Forehead    Lupus (HCC)     Osteopenia 2013    RA (rheumatoid arthritis) (HCC)     Sjogren's disease (HCC)        Past Surgical History:   Procedure Laterality Date    COLONOSCOPY  10/17/2016    Dr Cano--open access-AVMs, 5 yr recall    COLONOSCOPY N/A 02/05/2021    Dr TEMITOPE Cornelius-10 yr recall    HYSTERECTOMY (CERVIX STATUS UNKNOWN)  1996    age 26    [facility]

## 2024-09-05 ENCOUNTER — TELEPHONE (OUTPATIENT)
Dept: NEUROLOGY | Age: 54
End: 2024-09-05

## 2024-09-05 NOTE — TELEPHONE ENCOUNTER
Patient called stating that she wants to have her MRI scheduled at [facility]. Patient received call from [facility] but this was supposed to be [facility] as discussed at her appointment.

## 2024-09-06 ENCOUNTER — PATIENT MESSAGE (OUTPATIENT)
Dept: NEUROLOGY | Age: 54
End: 2024-09-06

## 2024-09-10 RX ORDER — UBROGEPANT 50 MG/1
2 TABLET ORAL PRN
Qty: 12 TABLET | Refills: 0 | Status: SHIPPED | COMMUNITY
Start: 2024-09-10

## 2024-09-11 ENCOUNTER — TELEPHONE (OUTPATIENT)
Dept: NEUROLOGY | Age: 54
End: 2024-09-11

## 2024-11-27 RX ORDER — SCOLOPAMINE TRANSDERMAL SYSTEM 1 MG/1
1 PATCH, EXTENDED RELEASE TRANSDERMAL
Qty: 4 PATCH | Refills: 2 | Status: SHIPPED | OUTPATIENT
Start: 2024-11-27

## 2024-11-27 NOTE — TELEPHONE ENCOUNTER
Patient requested medication for motion sickness due to anticipated travel. Scopolamine patches were sent to [facility]'s pharmacy      Electronically signed by ELEONORA BLANCO PA-C on 11/27/24 at 3:09 PM CST

## 2025-01-14 DIAGNOSIS — R10.84 GENERALIZED ABDOMINAL PAIN: Primary | ICD-10-CM

## 2025-01-14 DIAGNOSIS — R10.84 GENERALIZED ABDOMINAL PAIN: ICD-10-CM

## 2025-01-14 LAB
ALBUMIN SERPL-MCNC: 4.3 G/DL (ref 3.5–5.2)
ALP SERPL-CCNC: 75 U/L (ref 35–104)
ALT SERPL-CCNC: 10 U/L (ref 5–33)
ANION GAP SERPL CALCULATED.3IONS-SCNC: 10 MMOL/L (ref 7–19)
AST SERPL-CCNC: 11 U/L (ref 5–32)
BILIRUB SERPL-MCNC: 0.2 MG/DL (ref 0.2–1.2)
BUN SERPL-MCNC: 16 MG/DL (ref 6–20)
CALCIUM SERPL-MCNC: 9.4 MG/DL (ref 8.6–10)
CHLORIDE SERPL-SCNC: 99 MMOL/L (ref 98–111)
CO2 SERPL-SCNC: 29 MMOL/L (ref 22–29)
CREAT SERPL-MCNC: 0.9 MG/DL (ref 0.5–0.9)
GLUCOSE SERPL-MCNC: 68 MG/DL (ref 70–99)
LIPASE SERPL-CCNC: 26 U/L (ref 13–60)
POTASSIUM SERPL-SCNC: 3.7 MMOL/L (ref 3.5–5)
PROT SERPL-MCNC: 6.9 G/DL (ref 6.4–8.3)
SODIUM SERPL-SCNC: 138 MMOL/L (ref 136–145)

## 2025-01-15 DIAGNOSIS — R10.84 GENERALIZED ABDOMINAL PAIN: ICD-10-CM

## 2025-01-16 ENCOUNTER — OFFICE VISIT (OUTPATIENT)
Dept: GASTROENTEROLOGY | Age: 55
End: 2025-01-16
Payer: OTHER GOVERNMENT

## 2025-01-16 ENCOUNTER — HOSPITAL ENCOUNTER (OUTPATIENT)
Dept: CT IMAGING | Age: 55
Discharge: HOME | End: 2025-01-16
Payer: OTHER GOVERNMENT

## 2025-01-16 VITALS
OXYGEN SATURATION: 98 % | WEIGHT: 129 LBS | BODY MASS INDEX: 23.74 KG/M2 | HEART RATE: 80 BPM | DIASTOLIC BLOOD PRESSURE: 70 MMHG | HEIGHT: 62 IN | SYSTOLIC BLOOD PRESSURE: 115 MMHG

## 2025-01-16 DIAGNOSIS — R10.84 GENERALIZED ABDOMINAL PAIN: Primary | ICD-10-CM

## 2025-01-16 DIAGNOSIS — K21.9 CHRONIC GERD: ICD-10-CM

## 2025-01-16 DIAGNOSIS — R11.0 NAUSEA: ICD-10-CM

## 2025-01-16 DIAGNOSIS — R10.84 GENERALIZED ABDOMINAL PAIN: ICD-10-CM

## 2025-01-16 PROCEDURE — 6360000004 HC RX CONTRAST MEDICATION: Performed by: NURSE PRACTITIONER

## 2025-01-16 PROCEDURE — 99214 OFFICE O/P EST MOD 30 MIN: CPT | Performed by: NURSE PRACTITIONER

## 2025-01-16 PROCEDURE — 74177 CT ABD & PELVIS W/CONTRAST: CPT

## 2025-01-16 RX ORDER — SUCRALFATE 1 G/1
1 TABLET ORAL 4 TIMES DAILY
Qty: 120 TABLET | Refills: 3 | Status: SHIPPED | OUTPATIENT
Start: 2025-01-16

## 2025-01-16 RX ORDER — IOPAMIDOL 755 MG/ML
75 INJECTION, SOLUTION INTRAVASCULAR
Status: COMPLETED | OUTPATIENT
Start: 2025-01-16 | End: 2025-01-16

## 2025-01-16 RX ORDER — ONDANSETRON 4 MG/1
4 TABLET, FILM COATED ORAL EVERY 8 HOURS PRN
Qty: 20 TABLET | Refills: 2 | Status: SHIPPED | OUTPATIENT
Start: 2025-01-16

## 2025-01-16 RX ADMIN — IOPAMIDOL 75 ML: 755 INJECTION, SOLUTION INTRAVENOUS at 10:42

## 2025-01-16 ASSESSMENT — ENCOUNTER SYMPTOMS
BLOOD IN STOOL: 0
NAUSEA: 1
RECTAL PAIN: 0
TROUBLE SWALLOWING: 0
COUGH: 0
ABDOMINAL DISTENTION: 0
DIARRHEA: 0
ANAL BLEEDING: 0
CHOKING: 0
SHORTNESS OF BREATH: 0
VOMITING: 0
ABDOMINAL PAIN: 1
CONSTIPATION: 0

## 2025-01-16 NOTE — PROGRESS NOTES
Subjective:     Patient ID: Sean Quinn is a 54 y.o. female  PCP: Enma Mohamud DO  Referring Provider: No ref. provider found    HPI  Patient presents to the office today with the following complaints: Abdominal Pain      Today, pt has c/o abdominal pain, generalized.  Chronic nausea.  Pain comes and goes.  Several bad days, followed by good days.  This is with bloating, foul smelling gas.  During spells of abdominal pain will have several BMs.  Stools are generally formed.  Denies any blood in stool.  Pt states she is concerned about her pancreas.  Lipase normal, hepatic enzymes normal.  Awaiting Fecal elastase.        Chronic GERD.  Taking Omeprazole 40 mg daily.  Denies any improvement in symptoms.  Continues to have breakthrough reflux.     Last EGD 2/5/2021 - [id number] 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, gastritis-Benign   Last Colonoscopy 2/5/2021 - 10 year recall   Family hx colon cancer/colon polyps - denies      Assessment:     1. Generalized abdominal pain    2. Nausea    3. Chronic GERD              Plan:   - Continue Omeprazole 40 mg daily  - Add Carafate 1 gm 2-4 times a day  - Ok for Zofran prn  - Check CT abd/pelvis   - Follow up in 4 weeks   - Consider GB imaging   - Call with any questions or concerns       Orders  Orders Placed This Encounter   Procedures    CT ABDOMEN PELVIS W IV CONTRAST Additional Contrast? Oral     Standing Status:   Future     Standing Expiration Date:   1/16/2026     Order Specific Question:   Additional Contrast?     Answer:   Oral     Order Specific Question:   STAT Creatinine as needed:     Answer:   No     Order Specific Question:   Reason for exam:     Answer:   abdominal pain, nausea     Medications  Orders Placed This Encounter   Medications    sucralfate (CARAFATE) 1 GM tablet     Sig: Take 1 tablet by mouth 4 times daily     Dispense:  120 tablet     Refill:  3    ondansetron (ZOFRAN) 4 MG tablet     Sig: Take 1 tablet by mouth every 8 hours

## 2025-01-19 LAB — ELASTASE PANC STL-MCNT: >800 UG/G

## 2025-01-20 DIAGNOSIS — R11.0 NAUSEA: ICD-10-CM

## 2025-01-20 DIAGNOSIS — R10.84 GENERALIZED ABDOMINAL PAIN: Primary | ICD-10-CM

## 2025-01-22 ENCOUNTER — OFFICE VISIT (OUTPATIENT)
Dept: NEUROLOGY | Age: 55
End: 2025-01-22
Payer: OTHER GOVERNMENT

## 2025-01-22 VITALS
HEIGHT: 62 IN | BODY MASS INDEX: 23.74 KG/M2 | SYSTOLIC BLOOD PRESSURE: 118 MMHG | WEIGHT: 129 LBS | DIASTOLIC BLOOD PRESSURE: 64 MMHG

## 2025-01-22 DIAGNOSIS — R41.3 MEMORY LOSS: Primary | ICD-10-CM

## 2025-01-22 DIAGNOSIS — R53.1 WEAKNESS: ICD-10-CM

## 2025-01-22 DIAGNOSIS — G89.29 CHRONIC BILATERAL LOW BACK PAIN WITHOUT SCIATICA: ICD-10-CM

## 2025-01-22 DIAGNOSIS — R20.0 NUMBNESS: ICD-10-CM

## 2025-01-22 DIAGNOSIS — R51.9 HEADACHE, UNSPECIFIED HEADACHE TYPE: ICD-10-CM

## 2025-01-22 DIAGNOSIS — M54.50 CHRONIC BILATERAL LOW BACK PAIN WITHOUT SCIATICA: ICD-10-CM

## 2025-01-22 PROCEDURE — 99214 OFFICE O/P EST MOD 30 MIN: CPT | Performed by: PSYCHIATRY & NEUROLOGY

## 2025-01-22 RX ORDER — UBROGEPANT 100 MG/1
TABLET ORAL
Qty: 16 TABLET | Refills: 5 | Status: SHIPPED | OUTPATIENT
Start: 2025-01-22

## 2025-01-22 NOTE — PROGRESS NOTES
[facility] Office Note      Patient:   Sean Quinn  MR#:    [id number]  Account Number:                         YOB: 1970  Date of Evaluation:  1/22/2025  Time of Note:                          3:03 PM  Primary/Referring Physician:  Enma Mohamud DO   Consulting Physician:  Jorge Gerber DO    FOLLOW UP VISIT    Chief Complaint   Patient presents with    Follow-up     Numbness       HISTORY OF PRESENT ILLNESS    Sean Quinn is a 54 y.o. year old female here for numbness, headaches, memory loss, and weakness.  Doing about the same since last seen overall, some waxing and waning. She notes memory loss, primarily short term, unchanged.  Also notes widespread numbness and tingling and also noting worsening weakness in the arms and legs, also unchanged.  Some gait difficulty noted as well.  She does note some neck pain which waxes and wane.  She notes a history of spina bifuda occulta.  MRI brain with mild nonspecific white matter changes otherwise negative.  Denies anxiety or depression. Recently diagnosed with Lupus, followed at [facility], now off Leflunomide given LFT abnormalities. On prednisone currently.  Has stopped plaquenil.   Workup as below.  On neurontin which has helped. Prior repeat MRI largely negative, very minimal white matter changes noted, non-specific.   Headaches waxing and waning, did not get ubrelvy through insurance.      Past Medical History:   Diagnosis Date    Allergic rhinitis     Asthma     Endometriosis     GERD (gastroesophageal reflux disease)     History of basal cell carcinoma 08/30/2021    Forehead    Lupus 12/2023    Osteopenia 2013    RA (rheumatoid arthritis) (HCC)     Sjogren's disease (HCC)        Past Surgical History:   Procedure Laterality Date    COLONOSCOPY  10/17/2016    Dr Cano--open access-AVMs, 5 yr recall    COLONOSCOPY N/A 02/05/2021    Dr TEMITOPE Cornelius-10 yr recall    HYSTERECTOMY (CERVIX STATUS UNKNOWN)  1996    age 26    [facility]

## 2025-01-23 ENCOUNTER — CLINICAL DOCUMENTATION (OUTPATIENT)
Dept: NEUROLOGY | Age: 55
End: 2025-01-23

## 2025-01-23 NOTE — PROGRESS NOTES
Approved  PA Detail   Prior authorization approved  Payer: [facility] [employer] Case ID: [id number]  Note from payer: CaseId:[id number];Status:Approved;Review Type:Prior Auth;Coverage Start Date:12/24/2024;Coverage End Date:07/22/2025;  Approval Details    Authorized from December 24, 2024 to July 22, 2025  Electronic appeal: Not supported  View History  Medication Being Authorized    Ubrogepant (UBRELVY) 100 MG TABS  Take 1 tab daily prn, may repeat x 1 after two hours, max 2 tabs per 24 hours  Dispense: 16 tablet Refills: 5   Start: 1/22/2025   Class: Normal Diagnoses: Headache, unspecified headache type   This order has been released to its destination.  To be filled at: [id number] - [place], [place] - [address] - P [phone] - F [phone]

## 2025-02-13 ENCOUNTER — OFFICE VISIT (OUTPATIENT)
Dept: GASTROENTEROLOGY | Age: 55
End: 2025-02-13
Payer: OTHER GOVERNMENT

## 2025-02-13 VITALS
HEIGHT: 62 IN | SYSTOLIC BLOOD PRESSURE: 115 MMHG | BODY MASS INDEX: 23.74 KG/M2 | HEART RATE: 73 BPM | OXYGEN SATURATION: 99 % | WEIGHT: 129 LBS | DIASTOLIC BLOOD PRESSURE: 80 MMHG

## 2025-02-13 DIAGNOSIS — R10.84 GENERALIZED ABDOMINAL PAIN: Primary | ICD-10-CM

## 2025-02-13 DIAGNOSIS — R11.0 NAUSEA: ICD-10-CM

## 2025-02-13 DIAGNOSIS — R10.13 EPIGASTRIC PAIN: ICD-10-CM

## 2025-02-13 DIAGNOSIS — K21.9 CHRONIC GERD: ICD-10-CM

## 2025-02-13 PROCEDURE — 99214 OFFICE O/P EST MOD 30 MIN: CPT | Performed by: NURSE PRACTITIONER

## 2025-02-13 ASSESSMENT — ENCOUNTER SYMPTOMS
VOMITING: 0
BLOOD IN STOOL: 0
CHOKING: 0
ANAL BLEEDING: 0
ABDOMINAL PAIN: 1
ABDOMINAL DISTENTION: 0
SHORTNESS OF BREATH: 0
NAUSEA: 1
COUGH: 0
CONSTIPATION: 0
RECTAL PAIN: 0
DIARRHEA: 0
TROUBLE SWALLOWING: 0

## 2025-02-13 NOTE — PROGRESS NOTES
Allergic/Immunologic: Negative for food allergies.   All other systems reviewed and are negative.        Objective:     /80 (Site: Left Upper Arm)   Pulse 73   Ht 1.575 m (5' 2\")   Wt 58.5 kg (129 lb)   SpO2 99%   BMI 23.59 kg/m²     Physical Exam  Constitutional:       Appearance: Normal appearance.   HENT:      Head: Normocephalic.      Right Ear: External ear normal.      Left Ear: External ear normal.      Nose: Nose normal.   Eyes:      General:         Right eye: No discharge.         Left eye: No discharge.      Extraocular Movements: Extraocular movements intact.      Conjunctiva/sclera: Conjunctivae normal.   Cardiovascular:      Rate and Rhythm: Normal rate.   Pulmonary:      Effort: Pulmonary effort is normal. No respiratory distress.   Abdominal:      General: There is no distension.   Musculoskeletal:         General: Normal range of motion.      Cervical back: Normal range of motion.   Skin:     General: Skin is warm and dry.   Neurological:      General: No focal deficit present.      Mental Status: She is alert and oriented to person, place, and time.   Psychiatric:         Mood and Affect: Mood normal.         Behavior: Behavior normal.

## 2025-03-20 ENCOUNTER — OFFICE VISIT (OUTPATIENT)
Dept: GASTROENTEROLOGY | Age: 55
End: 2025-03-20
Payer: OTHER GOVERNMENT

## 2025-03-20 VITALS
HEIGHT: 62 IN | OXYGEN SATURATION: 98 % | SYSTOLIC BLOOD PRESSURE: 120 MMHG | DIASTOLIC BLOOD PRESSURE: 80 MMHG | BODY MASS INDEX: 24.29 KG/M2 | WEIGHT: 132 LBS | HEART RATE: 59 BPM

## 2025-03-20 DIAGNOSIS — K21.9 CHRONIC GERD: Primary | ICD-10-CM

## 2025-03-20 DIAGNOSIS — K58.1 IRRITABLE BOWEL SYNDROME WITH CONSTIPATION: ICD-10-CM

## 2025-03-20 DIAGNOSIS — R10.13 EPIGASTRIC PAIN: ICD-10-CM

## 2025-03-20 PROCEDURE — 99214 OFFICE O/P EST MOD 30 MIN: CPT | Performed by: NURSE PRACTITIONER

## 2025-03-20 NOTE — PROGRESS NOTES
Subjective:     Patient ID: Sean Quinn is a 54 y.o. female  PCP: Enma Mohamud DO  Referring Provider: No ref. provider found    HPI  Patient presents to the office today with the following complaints: Follow-up      History of Present Illness  The patient presents for evaluation of constipation, gastroesophageal reflux disease, and lupus.    She reports a worsening of her constipation, necessitating the use of enemas and MiraLAX to facilitate bowel movements. Despite these interventions, she experiences incomplete evacuation, with a return to her pre-defecation state the following day. She describes her stools as type 2 on the [place] Stool Chart. She also reports bloating and discomfort, likening her appearance to that of a six-month pregnant woman. Her last bowel movement was earlier today. She recalls an incident in 2010 where she experienced a seizure two days after starting a new medication, which she believes was not Linzess. She is uncertain if this was related to her lupus, as she was not aware of her diagnosis at the time. The medication did aid in bowel movements, but due to the seizure, she was advised to discontinue its use. She has been managing her symptoms with daily MiraLAX and weekly enemas.    Her reflux symptoms have shown some improvement, although they remain inconsistent. She continues to experience intermittent pain. She continues to take Carafate once daily and omeprazole.    She also reports episodes of dizziness and heart palpitations, which she attributes to her lupus.    MEDICATIONS  MiraLAX, enemas, Carafate, omeprazole, Zofran      Last EGD 2/5/2021 - [id number] 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, gastritis-Benign   Last Colonoscopy 2/5/2021 - 10 year recall   Family hx colon cancer/colon polyps - denies    Results      Assessment:     1. Chronic GERD    2. Epigastric pain    3. Irritable bowel syndrome with constipation              Plan:     Assessment &

## 2025-03-24 ASSESSMENT — ENCOUNTER SYMPTOMS
COUGH: 0
DIARRHEA: 0
VOMITING: 0
RECTAL PAIN: 0
ABDOMINAL DISTENTION: 0
TROUBLE SWALLOWING: 0
BLOOD IN STOOL: 0
CONSTIPATION: 1
NAUSEA: 0
CHOKING: 0
ANAL BLEEDING: 0
SHORTNESS OF BREATH: 0
ABDOMINAL PAIN: 1

## 2025-05-21 ENCOUNTER — OFFICE VISIT (OUTPATIENT)
Dept: NEUROLOGY | Age: 55
End: 2025-05-21
Payer: OTHER GOVERNMENT

## 2025-05-21 VITALS
HEART RATE: 76 BPM | BODY MASS INDEX: 24.29 KG/M2 | HEIGHT: 62 IN | SYSTOLIC BLOOD PRESSURE: 122 MMHG | DIASTOLIC BLOOD PRESSURE: 74 MMHG | OXYGEN SATURATION: 97 % | WEIGHT: 132 LBS

## 2025-05-21 DIAGNOSIS — R41.3 MEMORY LOSS: ICD-10-CM

## 2025-05-21 DIAGNOSIS — R51.9 HEADACHE, UNSPECIFIED HEADACHE TYPE: Primary | ICD-10-CM

## 2025-05-21 DIAGNOSIS — M54.50 CHRONIC BILATERAL LOW BACK PAIN WITHOUT SCIATICA: ICD-10-CM

## 2025-05-21 DIAGNOSIS — R20.0 NUMBNESS: ICD-10-CM

## 2025-05-21 DIAGNOSIS — G89.29 CHRONIC BILATERAL LOW BACK PAIN WITHOUT SCIATICA: ICD-10-CM

## 2025-05-21 DIAGNOSIS — R53.1 WEAKNESS: ICD-10-CM

## 2025-05-21 PROCEDURE — 99214 OFFICE O/P EST MOD 30 MIN: CPT | Performed by: PSYCHIATRY & NEUROLOGY

## 2025-05-21 NOTE — PROGRESS NOTES
03/31/2022    HGB 12.9 03/31/2022    HCT 39.8 03/31/2022    MCV 94.3 03/31/2022     03/31/2022     Lab Results   Component Value Date     01/14/2025    K 3.7 01/14/2025    CL 99 01/14/2025    CO2 29 01/14/2025    BUN 16 01/14/2025    CREATININE 0.9 01/14/2025    GLUCOSE 68 (L) 01/14/2025    CALCIUM 9.4 01/14/2025    BILITOT 0.2 01/14/2025    ALKPHOS 75 01/14/2025    AST 11 01/14/2025    ALT 10 01/14/2025    LABGLOM 75 01/14/2025    GFRAA >59 03/31/2022     Records reviewed.     MRI with minimal small vessel disease noted, nonspecific.     Labs negative outside of mild LFT abnormalities.    MRI L-spine with mild ddd, no overt spinal stenosis, no nf narrowing.     NCS/EMG normal x 4 ext     MRI C-spine 2018 with mild ddd    MRI brain normal 11/2021      ASSESSMENT:    Sean Quinn is a 54 y.o. year old female here for numbness, weakness, headaches, memory loss.  MRI with nonspecific white matter changes, no enhancing lesions and not overtly suggestive of demyelinating disease.  Prior repeat MRI stable, no progression noted. History of underlying Lupus and spinal bifida occulta noted. MRI C/L-spine, NCS/EMG, labs largely negative. Left knee pain noted, ortho following, possible Baker's cyst noted per patient.  Headaches waxing and waning.  Doing about the same overall since last seen. Prior repeat MRI was stable.     PLAN:  1.  Hold off on LP for now as MS felt somewhat unlikely given MRI. May reconsider if worsens.   2.  Follow up with Rheumatology as directed.   3.  Continue Neurontin for symptomatic treatment at 300 mg tid prn.    4.  Follow up with primary for mild LFT abnormalities.  Rheumatology stopped Leflunomide.  5.  Ortho following left knee pain.   6.  Continue ubrelvy prn, consider headache preventative if worsens.       Jorge Gerber, DO  Board Certified Neurology

## (undated) DEVICE — ENDO KIT,LOURDES HOSPITAL: Brand: MEDLINE INDUSTRIES, INC.

## (undated) DEVICE — FORCEPS BX 240CM 2.4MM L NDL RAD JAW 4 M00513334